# Patient Record
Sex: FEMALE | Race: WHITE | Employment: UNEMPLOYED | URBAN - METROPOLITAN AREA
[De-identification: names, ages, dates, MRNs, and addresses within clinical notes are randomized per-mention and may not be internally consistent; named-entity substitution may affect disease eponyms.]

---

## 2017-01-22 ENCOUNTER — APPOINTMENT (OUTPATIENT)
Dept: NUCLEAR MEDICINE | Facility: HOSPITAL | Age: 57
DRG: 698 | End: 2017-01-22
Attending: EMERGENCY MEDICINE
Payer: MEDICAID

## 2017-01-22 ENCOUNTER — HOSPITAL ENCOUNTER (INPATIENT)
Facility: HOSPITAL | Age: 57
LOS: 5 days | Discharge: HOME | DRG: 698 | End: 2017-01-27
Attending: EMERGENCY MEDICINE | Admitting: HOSPITALIST
Payer: MEDICAID

## 2017-01-22 ENCOUNTER — APPOINTMENT (OUTPATIENT)
Dept: GENERAL RADIOLOGY | Facility: HOSPITAL | Age: 57
DRG: 698 | End: 2017-01-22
Attending: EMERGENCY MEDICINE
Payer: MEDICAID

## 2017-01-22 DIAGNOSIS — R07.9 ACUTE CHEST PAIN: Primary | ICD-10-CM

## 2017-01-22 PROBLEM — I21.4 NSTEMI (NON-ST ELEVATED MYOCARDIAL INFARCTION) (HCC): Status: ACTIVE | Noted: 2017-01-22

## 2017-01-22 LAB
ANION GAP SERPL CALC-SCNC: 12 MMOL/L (ref 0–18)
APTT PPP: 28.9 SECONDS (ref 23.2–35.3)
APTT PPP: 59.6 SECONDS (ref 23.2–35.3)
BASOPHILS # BLD: 0.1 K/UL (ref 0–0.2)
BASOPHILS NFR BLD: 1 %
BUN SERPL-MCNC: 22 MG/DL (ref 8–20)
BUN/CREAT SERPL: 17.1 (ref 10–20)
CALCIUM SERPL-MCNC: 9 MG/DL (ref 8.5–10.5)
CHLORIDE SERPL-SCNC: 103 MMOL/L (ref 95–110)
CHOLEST SERPL-MCNC: 194 MG/DL (ref 110–200)
CO2 SERPL-SCNC: 21 MMOL/L (ref 22–32)
CREAT SERPL-MCNC: 1.29 MG/DL (ref 0.5–1.5)
D DIMER PPP FEU-MCNC: 1.63 MCG/ML
EOSINOPHIL # BLD: 0.1 K/UL (ref 0–0.7)
EOSINOPHIL NFR BLD: 2 %
ERYTHROCYTE [DISTWIDTH] IN BLOOD BY AUTOMATED COUNT: 14.3 % (ref 11–15)
GLUCOSE BLDC GLUCOMTR-MCNC: 123 MG/DL (ref 70–99)
GLUCOSE BLDC GLUCOMTR-MCNC: 143 MG/DL (ref 70–99)
GLUCOSE BLDC GLUCOMTR-MCNC: 201 MG/DL (ref 70–99)
GLUCOSE SERPL-MCNC: 191 MG/DL (ref 70–99)
HCT VFR BLD AUTO: 36.7 % (ref 35–48)
HDLC SERPL-MCNC: 36 MG/DL
HGB BLD-MCNC: 12.3 G/DL (ref 12–16)
INR BLD: 1.1 (ref 0.9–1.2)
LDLC SERPL CALC-MCNC: 140 MG/DL (ref 0–99)
LYMPHOCYTES # BLD: 1.1 K/UL (ref 1–4)
LYMPHOCYTES NFR BLD: 17 %
MCH RBC QN AUTO: 30.7 PG (ref 27–32)
MCHC RBC AUTO-ENTMCNC: 33.4 G/DL (ref 32–37)
MCV RBC AUTO: 92 FL (ref 80–100)
MONOCYTES # BLD: 0.5 K/UL (ref 0–1)
MONOCYTES NFR BLD: 7 %
NEUTROPHILS # BLD AUTO: 4.8 K/UL (ref 1.8–7.7)
NEUTROPHILS NFR BLD: 73 %
NONHDLC SERPL-MCNC: 158 MG/DL
OSMOLALITY UR CALC.SUM OF ELEC: 290 MOSM/KG (ref 275–295)
PLATELET # BLD AUTO: 232 K/UL (ref 140–400)
PMV BLD AUTO: 10.4 FL (ref 7.4–10.3)
POTASSIUM SERPL-SCNC: 3.3 MMOL/L (ref 3.3–5.1)
PROTHROMBIN TIME: 14 SECONDS (ref 11.8–14.5)
RBC # BLD AUTO: 3.99 M/UL (ref 3.7–5.4)
SODIUM SERPL-SCNC: 136 MMOL/L (ref 136–144)
T4 FREE SERPL-MCNC: 0.82 NG/DL (ref 0.58–1.64)
TRIGL SERPL-MCNC: 89 MG/DL (ref 1–149)
TROPONIN I SERPL-MCNC: 0.08 NG/ML (ref ?–0.03)
TSH SERPL-ACNC: 7.03 UIU/ML (ref 0.34–5.6)
WBC # BLD AUTO: 6.5 K/UL (ref 4–11)

## 2017-01-22 PROCEDURE — 82962 GLUCOSE BLOOD TEST: CPT

## 2017-01-22 PROCEDURE — 84484 ASSAY OF TROPONIN QUANT: CPT | Performed by: HOSPITALIST

## 2017-01-22 PROCEDURE — 99285 EMERGENCY DEPT VISIT HI MDM: CPT

## 2017-01-22 PROCEDURE — 85025 COMPLETE CBC W/AUTO DIFF WBC: CPT | Performed by: EMERGENCY MEDICINE

## 2017-01-22 PROCEDURE — 78582 LUNG VENTILAT&PERFUS IMAGING: CPT

## 2017-01-22 PROCEDURE — 85730 THROMBOPLASTIN TIME PARTIAL: CPT | Performed by: EMERGENCY MEDICINE

## 2017-01-22 PROCEDURE — 85730 THROMBOPLASTIN TIME PARTIAL: CPT | Performed by: HOSPITALIST

## 2017-01-22 PROCEDURE — 84443 ASSAY THYROID STIM HORMONE: CPT | Performed by: HOSPITALIST

## 2017-01-22 PROCEDURE — 93010 ELECTROCARDIOGRAM REPORT: CPT | Performed by: EMERGENCY MEDICINE

## 2017-01-22 PROCEDURE — 85610 PROTHROMBIN TIME: CPT | Performed by: EMERGENCY MEDICINE

## 2017-01-22 PROCEDURE — 96374 THER/PROPH/DIAG INJ IV PUSH: CPT

## 2017-01-22 PROCEDURE — 84439 ASSAY OF FREE THYROXINE: CPT | Performed by: HOSPITALIST

## 2017-01-22 PROCEDURE — 71010 XR CHEST AP PORTABLE  (CPT=71010): CPT

## 2017-01-22 PROCEDURE — 93005 ELECTROCARDIOGRAM TRACING: CPT

## 2017-01-22 PROCEDURE — 84484 ASSAY OF TROPONIN QUANT: CPT | Performed by: EMERGENCY MEDICINE

## 2017-01-22 PROCEDURE — 83036 HEMOGLOBIN GLYCOSYLATED A1C: CPT | Performed by: HOSPITALIST

## 2017-01-22 PROCEDURE — 80061 LIPID PANEL: CPT | Performed by: EMERGENCY MEDICINE

## 2017-01-22 PROCEDURE — 80048 BASIC METABOLIC PNL TOTAL CA: CPT | Performed by: EMERGENCY MEDICINE

## 2017-01-22 PROCEDURE — 85379 FIBRIN DEGRADATION QUANT: CPT | Performed by: EMERGENCY MEDICINE

## 2017-01-22 RX ORDER — POTASSIUM CHLORIDE 20 MEQ/1
40 TABLET, EXTENDED RELEASE ORAL EVERY 4 HOURS
Status: COMPLETED | OUTPATIENT
Start: 2017-01-22 | End: 2017-01-22

## 2017-01-22 RX ORDER — ASPIRIN 81 MG/1
81 TABLET, CHEWABLE ORAL DAILY
Status: DISCONTINUED | OUTPATIENT
Start: 2017-01-22 | End: 2017-01-22

## 2017-01-22 RX ORDER — ONDANSETRON 2 MG/ML
4 INJECTION INTRAMUSCULAR; INTRAVENOUS EVERY 6 HOURS PRN
Status: DISCONTINUED | OUTPATIENT
Start: 2017-01-22 | End: 2017-01-27

## 2017-01-22 RX ORDER — LISINOPRIL 40 MG/1
40 TABLET ORAL DAILY
Status: ON HOLD | COMMUNITY
End: 2017-01-23

## 2017-01-22 RX ORDER — HEPARIN SODIUM 1000 [USP'U]/ML
60 INJECTION, SOLUTION INTRAVENOUS; SUBCUTANEOUS ONCE
Status: COMPLETED | OUTPATIENT
Start: 2017-01-22 | End: 2017-01-22

## 2017-01-22 RX ORDER — DEXTROSE MONOHYDRATE 25 G/50ML
50 INJECTION, SOLUTION INTRAVENOUS AS NEEDED
Status: DISCONTINUED | OUTPATIENT
Start: 2017-01-22 | End: 2017-01-27

## 2017-01-22 RX ORDER — HYDROCHLOROTHIAZIDE 25 MG/1
25 TABLET ORAL DAILY
Status: ON HOLD | COMMUNITY
End: 2017-01-23

## 2017-01-22 RX ORDER — LEVOTHYROXINE SODIUM 0.05 MG/1
50 TABLET ORAL
Status: DISCONTINUED | OUTPATIENT
Start: 2017-01-22 | End: 2017-01-27

## 2017-01-22 RX ORDER — HEPARIN SODIUM 1000 [USP'U]/ML
INJECTION, SOLUTION INTRAVENOUS; SUBCUTANEOUS
Status: COMPLETED
Start: 2017-01-22 | End: 2017-01-22

## 2017-01-22 RX ORDER — HEPARIN SODIUM AND DEXTROSE 10000; 5 [USP'U]/100ML; G/100ML
12 INJECTION INTRAVENOUS ONCE
Status: COMPLETED | OUTPATIENT
Start: 2017-01-22 | End: 2017-01-23

## 2017-01-22 RX ORDER — NITROGLYCERIN 20 MG/100ML
10 INJECTION INTRAVENOUS CONTINUOUS
Status: DISCONTINUED | OUTPATIENT
Start: 2017-01-22 | End: 2017-01-22

## 2017-01-22 RX ORDER — MORPHINE SULFATE 4 MG/ML
2 INJECTION, SOLUTION INTRAMUSCULAR; INTRAVENOUS ONCE
Status: DISCONTINUED | OUTPATIENT
Start: 2017-01-22 | End: 2017-01-22

## 2017-01-22 RX ORDER — ASPIRIN 325 MG
325 TABLET ORAL DAILY
Status: DISCONTINUED | OUTPATIENT
Start: 2017-01-22 | End: 2017-01-27

## 2017-01-22 RX ORDER — ACETAMINOPHEN 325 MG/1
650 TABLET ORAL EVERY 6 HOURS PRN
Status: DISCONTINUED | OUTPATIENT
Start: 2017-01-22 | End: 2017-01-27

## 2017-01-22 RX ORDER — HEPARIN SODIUM AND DEXTROSE 10000; 5 [USP'U]/100ML; G/100ML
INJECTION INTRAVENOUS CONTINUOUS
Status: DISCONTINUED | OUTPATIENT
Start: 2017-01-22 | End: 2017-01-23

## 2017-01-22 RX ORDER — AMLODIPINE BESYLATE 2.5 MG/1
2.5 TABLET ORAL DAILY
Status: DISCONTINUED | OUTPATIENT
Start: 2017-01-22 | End: 2017-01-23

## 2017-01-22 RX ORDER — ATORVASTATIN CALCIUM 40 MG/1
40 TABLET, FILM COATED ORAL NIGHTLY
Status: DISCONTINUED | OUTPATIENT
Start: 2017-01-22 | End: 2017-01-27

## 2017-01-22 RX ORDER — LEVOTHYROXINE SODIUM 0.05 MG/1
50 TABLET ORAL
Status: ON HOLD | COMMUNITY
End: 2017-01-23

## 2017-01-22 RX ORDER — LISINOPRIL 20 MG/1
20 TABLET ORAL DAILY
Status: DISCONTINUED | OUTPATIENT
Start: 2017-01-22 | End: 2017-01-22

## 2017-01-22 RX ORDER — AMLODIPINE BESYLATE 5 MG/1
2.5 TABLET ORAL DAILY
Status: DISCONTINUED | OUTPATIENT
Start: 2017-01-23 | End: 2017-01-22

## 2017-01-22 NOTE — ED PROVIDER NOTES
Patient Seen in: [facility] AND [facility] Emergency Department    History   Patient presents with:  Dyspnea JIMMY SOB (respiratory)    Stated Complaint:     HPI    76-year-old female with history of diabetes, hypertension, hyperlipidemia presents for evaluation o Current:/90 mmHg  Pulse 63  Temp(Src) 98 °F (36.7 °C) (Temporal)  Resp 19  Wt 66.044 kg  SpO2 95%        Physical Exam   Constitutional: She is oriented to person, place, and time. She appears well-developed and well-nourished.  She appears dist components within normal limits   POCT GLUCOSE - Abnormal; Notable for the following:     POC Glucose  201 (*)     All other components within normal limits   CBC W/ DIFFERENTIAL - Abnormal; Notable for the following:     MPV 10.4 (*)     All other compone Impression:  Acute chest pain  (primary encounter diagnosis)    Disposition:  Admit    Follow-up:  No follow-up provider specified. Medications Prescribed:  There are no discharge medications for this patient.       Present on Admission           ICD-10-

## 2017-01-22 NOTE — H&P
[facility] Hospitalist H&P       CC: Patient presents with:  Dyspnea JIMMY SOB (respiratory)       PCP: PHYSICIAN NONSTAFF    History of Present Illness: Patient is a 61year old female with PMH sig for HLD, HTN, DM with neuropathy, hs of congential hand and foot obvious abnormality, atraumatic. Eyes:  Sclera anicteric, No conjunctival pallor, EOMs intact. Nose: Nares normal. Septum midline. Mucosa normal. No drainage.    Throat: Lips, mucosa, and tongue normal. Teeth and gums normal.   Neck: Supple, symmetrica given, nitro  - trop 0.08 -> 0.08   - now chest pain free  - started on heparin drip per cardiology  - continue asa, BB, add statin, and continue heparin drip  - trend trop, check VQ to RO PE given elevated DDIMER  - admit to tele, poss angiogram in am per

## 2017-01-23 ENCOUNTER — APPOINTMENT (OUTPATIENT)
Dept: NUCLEAR MEDICINE | Facility: HOSPITAL | Age: 57
DRG: 698 | End: 2017-01-23
Attending: INTERNAL MEDICINE
Payer: MEDICAID

## 2017-01-23 ENCOUNTER — APPOINTMENT (OUTPATIENT)
Dept: CV DIAGNOSTICS | Facility: HOSPITAL | Age: 57
DRG: 698 | End: 2017-01-23
Attending: INTERNAL MEDICINE
Payer: MEDICAID

## 2017-01-23 LAB
ANION GAP SERPL CALC-SCNC: 11 MMOL/L (ref 0–18)
APTT PPP: 29.1 SECONDS (ref 23.2–35.3)
APTT PPP: 60.1 SECONDS (ref 23.2–35.3)
BASOPHILS # BLD: 0.1 K/UL (ref 0–0.2)
BASOPHILS NFR BLD: 2 %
BUN SERPL-MCNC: 21 MG/DL (ref 8–20)
BUN/CREAT SERPL: 18.1 (ref 10–20)
CALCIUM SERPL-MCNC: 9.1 MG/DL (ref 8.5–10.5)
CHLORIDE SERPL-SCNC: 109 MMOL/L (ref 95–110)
CHOLEST SERPL-MCNC: 173 MG/DL (ref 110–200)
CO2 SERPL-SCNC: 20 MMOL/L (ref 22–32)
CREAT SERPL-MCNC: 1.16 MG/DL (ref 0.5–1.5)
EOSINOPHIL # BLD: 0.2 K/UL (ref 0–0.7)
EOSINOPHIL NFR BLD: 3 %
ERYTHROCYTE [DISTWIDTH] IN BLOOD BY AUTOMATED COUNT: 14.7 % (ref 11–15)
GLUCOSE BLDC GLUCOMTR-MCNC: 117 MG/DL (ref 70–99)
GLUCOSE BLDC GLUCOMTR-MCNC: 142 MG/DL (ref 70–99)
GLUCOSE BLDC GLUCOMTR-MCNC: 171 MG/DL (ref 70–99)
GLUCOSE SERPL-MCNC: 143 MG/DL (ref 70–99)
HBA1C MFR BLD: 6.9 % (ref 4–6)
HCT VFR BLD AUTO: 34.7 % (ref 35–48)
HDLC SERPL-MCNC: 33 MG/DL
HGB BLD-MCNC: 11.7 G/DL (ref 12–16)
INR BLD: 1.2 (ref 0.9–1.2)
INR BLD: 1.2 (ref 0.9–1.2)
LDLC SERPL CALC-MCNC: 112 MG/DL (ref 0–99)
LYMPHOCYTES # BLD: 1.6 K/UL (ref 1–4)
LYMPHOCYTES NFR BLD: 28 %
MAGNESIUM SERPL-MCNC: 1.9 MG/DL (ref 1.8–2.5)
MCH RBC QN AUTO: 31.1 PG (ref 27–32)
MCHC RBC AUTO-ENTMCNC: 33.6 G/DL (ref 32–37)
MCV RBC AUTO: 92.5 FL (ref 80–100)
MONOCYTES # BLD: 0.4 K/UL (ref 0–1)
MONOCYTES NFR BLD: 7 %
NEUTROPHILS # BLD AUTO: 3.3 K/UL (ref 1.8–7.7)
NEUTROPHILS NFR BLD: 58 %
NONHDLC SERPL-MCNC: 140 MG/DL
OSMOLALITY UR CALC.SUM OF ELEC: 295 MOSM/KG (ref 275–295)
PLATELET # BLD AUTO: 218 K/UL (ref 140–400)
PMV BLD AUTO: 10.6 FL (ref 7.4–10.3)
POTASSIUM SERPL-SCNC: 4.5 MMOL/L (ref 3.3–5.1)
POTASSIUM SERPL-SCNC: 4.5 MMOL/L (ref 3.3–5.1)
PROTHROMBIN TIME: 14.5 SECONDS (ref 11.8–14.5)
PROTHROMBIN TIME: 14.7 SECONDS (ref 11.8–14.5)
RBC # BLD AUTO: 3.75 M/UL (ref 3.7–5.4)
SODIUM SERPL-SCNC: 140 MMOL/L (ref 136–144)
TRIGL SERPL-MCNC: 142 MG/DL (ref 1–149)
WBC # BLD AUTO: 5.6 K/UL (ref 4–11)

## 2017-01-23 PROCEDURE — 82962 GLUCOSE BLOOD TEST: CPT

## 2017-01-23 PROCEDURE — 84132 ASSAY OF SERUM POTASSIUM: CPT | Performed by: HOSPITALIST

## 2017-01-23 PROCEDURE — 80061 LIPID PANEL: CPT | Performed by: HOSPITALIST

## 2017-01-23 PROCEDURE — 85610 PROTHROMBIN TIME: CPT | Performed by: HOSPITALIST

## 2017-01-23 PROCEDURE — 80048 BASIC METABOLIC PNL TOTAL CA: CPT | Performed by: HOSPITALIST

## 2017-01-23 PROCEDURE — 83735 ASSAY OF MAGNESIUM: CPT | Performed by: HOSPITALIST

## 2017-01-23 PROCEDURE — 78452 HT MUSCLE IMAGE SPECT MULT: CPT

## 2017-01-23 PROCEDURE — 85730 THROMBOPLASTIN TIME PARTIAL: CPT | Performed by: HOSPITALIST

## 2017-01-23 PROCEDURE — 93016 CV STRESS TEST SUPVJ ONLY: CPT | Performed by: INTERNAL MEDICINE

## 2017-01-23 PROCEDURE — 85025 COMPLETE CBC W/AUTO DIFF WBC: CPT | Performed by: HOSPITALIST

## 2017-01-23 PROCEDURE — 93017 CV STRESS TEST TRACING ONLY: CPT

## 2017-01-23 PROCEDURE — 85730 THROMBOPLASTIN TIME PARTIAL: CPT | Performed by: INTERNAL MEDICINE

## 2017-01-23 PROCEDURE — 85610 PROTHROMBIN TIME: CPT | Performed by: INTERNAL MEDICINE

## 2017-01-23 PROCEDURE — 78452 HT MUSCLE IMAGE SPECT MULT: CPT | Performed by: INTERNAL MEDICINE

## 2017-01-23 PROCEDURE — 93018 CV STRESS TEST I&R ONLY: CPT | Performed by: INTERNAL MEDICINE

## 2017-01-23 RX ORDER — SODIUM CHLORIDE 0.9 % (FLUSH) 0.9 %
SYRINGE (ML) INJECTION
Status: COMPLETED
Start: 2017-01-23 | End: 2017-01-23

## 2017-01-23 RX ORDER — AMLODIPINE BESYLATE 5 MG/1
5 TABLET ORAL DAILY
Status: DISCONTINUED | OUTPATIENT
Start: 2017-01-24 | End: 2017-01-27

## 2017-01-23 RX ORDER — ASPIRIN 81 MG/1
324 TABLET, CHEWABLE ORAL ONCE
Status: DISCONTINUED | OUTPATIENT
Start: 2017-01-24 | End: 2017-01-23

## 2017-01-23 RX ORDER — CHLORHEXIDINE GLUCONATE 4 G/100ML
30 SOLUTION TOPICAL
Status: COMPLETED | OUTPATIENT
Start: 2017-01-24 | End: 2017-01-24

## 2017-01-23 RX ORDER — ATORVASTATIN CALCIUM 40 MG/1
40 TABLET, FILM COATED ORAL NIGHTLY
Qty: 30 TABLET | Refills: 0 | Status: SHIPPED | OUTPATIENT
Start: 2017-01-23 | End: 2017-01-31

## 2017-01-23 RX ORDER — AMLODIPINE BESYLATE 5 MG/1
5 TABLET ORAL DAILY
Qty: 30 TABLET | Refills: 0 | Status: SHIPPED | OUTPATIENT
Start: 2017-01-24 | End: 2017-01-31

## 2017-01-23 RX ORDER — SODIUM CHLORIDE 9 MG/ML
INJECTION, SOLUTION INTRAVENOUS CONTINUOUS
Status: DISCONTINUED | OUTPATIENT
Start: 2017-01-24 | End: 2017-01-26

## 2017-01-23 RX ORDER — LEVOTHYROXINE SODIUM 0.05 MG/1
50 TABLET ORAL
Qty: 30 TABLET | Refills: 0 | Status: SHIPPED | OUTPATIENT
Start: 2017-01-23 | End: 2017-02-21

## 2017-01-23 RX ORDER — LISINOPRIL 40 MG/1
40 TABLET ORAL DAILY
Qty: 30 TABLET | Refills: 0 | Status: SHIPPED | OUTPATIENT
Start: 2017-01-23 | End: 2017-01-24

## 2017-01-23 RX ORDER — AMLODIPINE BESYLATE 2.5 MG/1
2.5 TABLET ORAL ONCE
Status: COMPLETED | OUTPATIENT
Start: 2017-01-23 | End: 2017-01-23

## 2017-01-23 NOTE — ED NOTES
Cardiac Event. Started Cardiac event at 11:47. Called the code. EKG done, patient attached to monitor, pulse ox and BP. Systolic Blood pressure was > 200. MD and Cardiologist aware.  Ordered 5 mg iv metoprolol at 11:50 and another 5 mg iv metoprolol at 11:5

## 2017-01-23 NOTE — PROGRESS NOTES
[facility] Hospitalist Progress Note     CC: Hospital Follow up    PCP: No primary care provider on file.        Assessment/Plan:     Principal Problem:    Acute chest pain  Active Problems:    NSTEMI (non-ST elevated myocardial infarction) [facility])    Patient is a 5 Weights  01/23/17 0518 : 149 lb 1.6 oz (67.631 kg)  01/22/17 1624 : 145 lb 9.6 oz (66.044 kg)  01/22/17 1136 : 145 lb 9.6 oz (66.044 kg)      Exam  Gen: No acute distress, alert and oriented x3, no focal neurologic deficits  Heent: NC AT, mucous memb clear

## 2017-01-23 NOTE — CONSULTS
[facility]    PATIENT'S NAME: MICHEL SOLO   ATTENDING PHYSICIAN: Tad D. Juliana Blizzard, MD   CONSULTING PHYSICIAN: DAREK Haas MD   PATIENT ACCOUNT#:   [id number]    [facility]:   [id number]       YOB: 1960  ADMI 195/120. HEENT:  Normal.  NECK:  Supple. There is no increased jugular venous pressure. LUNGS:  Clear. HEART:  S1, S2. There is a soft systolic murmur. No S3.  ABDOMEN:  Soft. It was nontender. Liver and spleen are not enlarged.   EXTREMITIES:  Digi

## 2017-01-23 NOTE — PLAN OF CARE
Patient/Family Long Term Goal Progressing      Patient/Family Short Term Goal Progressing      Patient not having any chest pain overnight. Heparin drip per protocol. Possible angiogram per [facility] cardiology today.

## 2017-01-23 NOTE — PROGRESS NOTES
Greg [address] [place] [address] Cardiology  Progress Note    Eva Kincaid Patient Status:  Inpatient    1960 MRN [id number]   Location [facility]5W Attending Tori Powell MD   Hosp Day # 1 PCP No primary care provider on file.      Impression:  IMPRESSION: Daily   metoprolol Tartrate (LOPRESSOR) tab 12.5 mg 12.5 mg Oral 2x Daily(Beta Blocker)   AmLODIPine Besylate (NORVASC) tab 2.5 mg 2.5 mg Oral Daily   Levothyroxine Sodium (SYNTHROID) tab 50 mcg 50 mcg Oral Before breakfast       Laboratory Data:    Lab Re

## 2017-01-23 NOTE — PAYOR COMM NOTE
Attending Physician: Denisse Arenas MD    Review Type: CONTINUED STAY  Reviewer: Alice Ruffin     Date: January 23, 2017 - 1:26 PM  Payor: [address] [place] [address]  Authorization Number: [id number]  Admit date: 1/22/2017 11:35 AM        REVIEWER CO ED Provider Notes by Horacio Sparks MD at 1/22/2017  2:55 PM  Version 2 of 2    Author:  oHracio Sparks MD Service:  (none) Author Type:  Physician    Filed:  1/22/2017  3:01 PM Note Time:  1/22/2017  2:55 PM Status:  Addendum    :  Stefan Bartholomew reviewed and negative except as noted above. PSFH elements reviewed from today and agreed except as otherwise stated in HPI.     Physical Exam     ED Triage Vitals   BP 01/22/17 1136 195/119 mmHg   Pulse 01/22/17 1136 101   Resp 01/22/17 1136 20   Temp 0 normal limits   TROPONIN I, 0 HOUR - Abnormal; Notable for the following:     Troponin 0.08 (*)     All other components within normal limits   LIPID PANEL - Abnormal; Notable for the following:     Non HDL Chol 158 (*)     LDL Cholesterol 140 (*)     All need for urgent cardiac catheterization. Noted to be hypertensive, given metoprolol with improvement in heart rate and blood pressure. Troponin Conor mildly elevated but stable, in the setting of renal dysfunction.   D-dimer elevated, VQ scan negative for P • Hyperlipidemia    • Thyroid disease    • Essential hypertension    • Diabetes ([facility] [address]    • Congenital hand deformity            Past Surgical History    AMPUTATION FOOT,TRANSMETATARSAL      CARPAL TUNNEL RELEASE         Medications :  Not on File       F alert and oriented to person, place, and time. No focal deficit   Skin: Skin is warm and dry. No rash noted. Psychiatric: She has a normal mood and affect. Nursing note and vitals reviewed.       Xr Chest Ap Portable  (cpt=71010)    1/22/2017  CONCLUS RAINBOW DRAW GOLD   RAINBOW DRAW LAVENDER   RAINBOW DRAW LIGHT GREEN   RAINBOW DRAW LAVENDER TALL (BNP)   RAINBOW DRAW DARK GREEN      EKG    Rate, intervals and axes as noted on EKG Report.    QTc 432   Rate: 76  Rhythm: Sinus Rhythm  Readin PMH sig for HLD, HTN, DM with neuropathy, hs of congential hand and foot deformities, poss CKD, who presents with Chest pain and sob.   Patient states she states on Friday she began to feel sob on and off, which progressed to yesterday, but was associated w mucosa, and tongue normal. Teeth and gums normal.   Neck: Supple, symmetrical, trachea midline, no cervical or supraclavicular lymph adenopathy, thyroid: no enlargment/tenderness/nodules appreciated   Lungs:   Clear to auscultation bilaterally.  Normal effo to RO PE given elevated DDIMER  - admit to tele, poss angiogram in am per cardiology  - check lipids and a1c    DM  - was on metformin, unknown a1c  - will hold metfromin  - check A1c  - ssi and accuchecks     HTN  - metoprolol  - hold hctz for now  - titr

## 2017-01-24 ENCOUNTER — APPOINTMENT (OUTPATIENT)
Dept: INTERVENTIONAL RADIOLOGY/VASCULAR | Facility: HOSPITAL | Age: 57
DRG: 698 | End: 2017-01-24
Attending: INTERNAL MEDICINE
Payer: MEDICAID

## 2017-01-24 LAB
BNP SERPL-MCNC: 1195 PG/ML (ref 0–100)
GLUCOSE BLDC GLUCOMTR-MCNC: 113 MG/DL (ref 70–99)
GLUCOSE BLDC GLUCOMTR-MCNC: 124 MG/DL (ref 70–99)
GLUCOSE BLDC GLUCOMTR-MCNC: 147 MG/DL (ref 70–99)
GLUCOSE BLDC GLUCOMTR-MCNC: 149 MG/DL (ref 70–99)

## 2017-01-24 PROCEDURE — 93460 R&L HRT ART/VENTRICLE ANGIO: CPT

## 2017-01-24 PROCEDURE — B2111ZZ FLUOROSCOPY OF MULTIPLE CORONARY ARTERIES USING LOW OSMOLAR CONTRAST: ICD-10-PCS | Performed by: HOSPITALIST

## 2017-01-24 PROCEDURE — 4A023N8 MEASUREMENT OF CARDIAC SAMPLING AND PRESSURE, BILATERAL, PERCUTANEOUS APPROACH: ICD-10-PCS | Performed by: HOSPITALIST

## 2017-01-24 PROCEDURE — 82962 GLUCOSE BLOOD TEST: CPT

## 2017-01-24 PROCEDURE — B2151ZZ FLUOROSCOPY OF LEFT HEART USING LOW OSMOLAR CONTRAST: ICD-10-PCS | Performed by: HOSPITALIST

## 2017-01-24 PROCEDURE — 83880 ASSAY OF NATRIURETIC PEPTIDE: CPT | Performed by: INTERNAL MEDICINE

## 2017-01-24 RX ORDER — LIDOCAINE HYDROCHLORIDE 20 MG/ML
INJECTION, SOLUTION EPIDURAL; INFILTRATION; INTRACAUDAL; PERINEURAL
Status: COMPLETED
Start: 2017-01-24 | End: 2017-01-24

## 2017-01-24 RX ORDER — ASPIRIN 325 MG
325 TABLET ORAL DAILY
Qty: 30 TABLET | Refills: 0 | Status: SHIPPED | OUTPATIENT
Start: 2017-01-24 | End: 2017-01-31

## 2017-01-24 RX ORDER — LISINOPRIL 5 MG/1
5 TABLET ORAL DAILY
Status: DISCONTINUED | OUTPATIENT
Start: 2017-01-24 | End: 2017-01-26

## 2017-01-24 RX ORDER — FUROSEMIDE 10 MG/ML
40 INJECTION INTRAMUSCULAR; INTRAVENOUS 3 TIMES DAILY
Status: DISCONTINUED | OUTPATIENT
Start: 2017-01-24 | End: 2017-01-24

## 2017-01-24 RX ORDER — HEPARIN SODIUM 5000 [USP'U]/ML
5000 INJECTION, SOLUTION INTRAVENOUS; SUBCUTANEOUS EVERY 12 HOURS SCHEDULED
Status: DISCONTINUED | OUTPATIENT
Start: 2017-01-25 | End: 2017-01-27

## 2017-01-24 RX ORDER — SPIRONOLACTONE 25 MG/1
25 TABLET ORAL DAILY
Status: DISCONTINUED | OUTPATIENT
Start: 2017-01-24 | End: 2017-01-26

## 2017-01-24 RX ORDER — FUROSEMIDE 10 MG/ML
40 INJECTION INTRAMUSCULAR; INTRAVENOUS
Status: DISCONTINUED | OUTPATIENT
Start: 2017-01-24 | End: 2017-01-26

## 2017-01-24 RX ORDER — MIDAZOLAM HYDROCHLORIDE 1 MG/ML
INJECTION INTRAMUSCULAR; INTRAVENOUS
Status: COMPLETED
Start: 2017-01-24 | End: 2017-01-24

## 2017-01-24 NOTE — PROGRESS NOTES
[facility] Hospitalist Progress Note     CC: Hospital Follow up    PCP: No primary care provider on file.        Assessment/Plan:     Principal Problem:    Acute chest pain  Active Problems:    NSTEMI (non-ST elevated myocardial infarction) [facility])    Patient is a 5 3392   Gross per 24 hour   Intake    600 ml   Output      0 ml   Net    600 ml       Last 3 Weights  01/24/17 0500 : 148 lb 9.6 oz (67.405 kg)  01/23/17 0518 : 149 lb 1.6 oz (67.631 kg)  01/22/17 1624 : 145 lb 9.6 oz (66.044 kg)  01/22/17 1136 : 145 lb 9.6 Tartrate  12.5 mg Oral 2x Daily(Beta Blocker)   • Levothyroxine Sodium  50 mcg Oral Before breakfast     • sodium chloride 20 mL/hr at 01/24/17 0637     dextrose, acetaminophen, ondansetron HCl

## 2017-01-24 NOTE — PAYOR COMM NOTE
Damien Treannielois #[id number]  (56 year old F)       [facility] [phone]         Fabby Garces MD Physician Signed  Progress Notes 1/24/2017  1:34 PM      Expand All Collapse All    [facility] Hospitalist Progress Note      CC: Hospital Follow up    PCP: No primary care pr °C)  Pulse:  [60-84] 63  Resp:  [12-18] 15  BP: (132-162)/() 149/86 mmHg      Intake/Output:    Intake/Output Summary (Last 24 hours) at 01/24/17 1334  Last data filed at 01/24/17 0637    Gross per 24 hour    Intake     600 ml    Output       0 ml  furosemide   40 mg  Intravenous  TID    •  spironolactone   25 mg  Oral  Daily    •  lisinopril   5 mg  Oral  Daily    •  AmLODIPine Besylate   5 mg  Oral  Daily    •  Atorvastatin Calcium   40 mg  Oral  Nightly    •  insulin aspart   1-7 Units  Subcutaneo

## 2017-01-24 NOTE — BRIEF PROCEDURE NOTE
[facility]    Brief Cardiac Cath Note  Trina Munguia Patient Status:  Inpatient    1960 MRN [id number]   Location [facility] Attending Fabby Garces MD   Hosp Day # 2 PCP No primary care provider on file.

## 2017-01-25 PROBLEM — I50.21 ACUTE SYSTOLIC HEART FAILURE (HCC): Status: ACTIVE | Noted: 2017-01-25

## 2017-01-25 LAB
ANION GAP SERPL CALC-SCNC: 9 MMOL/L (ref 0–18)
BASOPHILS # BLD: 0.1 K/UL (ref 0–0.2)
BASOPHILS NFR BLD: 1 %
BUN SERPL-MCNC: 25 MG/DL (ref 8–20)
BUN/CREAT SERPL: 19.5 (ref 10–20)
CALCIUM SERPL-MCNC: 9.4 MG/DL (ref 8.5–10.5)
CHLORIDE SERPL-SCNC: 107 MMOL/L (ref 95–110)
CO2 SERPL-SCNC: 24 MMOL/L (ref 22–32)
CREAT SERPL-MCNC: 1.28 MG/DL (ref 0.5–1.5)
EOSINOPHIL # BLD: 0.3 K/UL (ref 0–0.7)
EOSINOPHIL NFR BLD: 4 %
ERYTHROCYTE [DISTWIDTH] IN BLOOD BY AUTOMATED COUNT: 14.5 % (ref 11–15)
GLUCOSE BLDC GLUCOMTR-MCNC: 118 MG/DL (ref 70–99)
GLUCOSE BLDC GLUCOMTR-MCNC: 120 MG/DL (ref 70–99)
GLUCOSE BLDC GLUCOMTR-MCNC: 129 MG/DL (ref 70–99)
GLUCOSE BLDC GLUCOMTR-MCNC: 191 MG/DL (ref 70–99)
GLUCOSE SERPL-MCNC: 125 MG/DL (ref 70–99)
HCT VFR BLD AUTO: 37.5 % (ref 35–48)
HGB BLD-MCNC: 12.4 G/DL (ref 12–16)
LYMPHOCYTES # BLD: 1.2 K/UL (ref 1–4)
LYMPHOCYTES NFR BLD: 16 %
MAGNESIUM SERPL-MCNC: 1.8 MG/DL (ref 1.8–2.5)
MCH RBC QN AUTO: 30.7 PG (ref 27–32)
MCHC RBC AUTO-ENTMCNC: 33.1 G/DL (ref 32–37)
MCV RBC AUTO: 92.8 FL (ref 80–100)
MONOCYTES # BLD: 0.6 K/UL (ref 0–1)
MONOCYTES NFR BLD: 8 %
NEUTROPHILS # BLD AUTO: 5.3 K/UL (ref 1.8–7.7)
NEUTROPHILS NFR BLD: 71 %
OSMOLALITY UR CALC.SUM OF ELEC: 296 MOSM/KG (ref 275–295)
PLATELET # BLD AUTO: 206 K/UL (ref 140–400)
PMV BLD AUTO: 10.2 FL (ref 7.4–10.3)
POTASSIUM SERPL-SCNC: 4.3 MMOL/L (ref 3.3–5.1)
RBC # BLD AUTO: 4.05 M/UL (ref 3.7–5.4)
SODIUM SERPL-SCNC: 140 MMOL/L (ref 136–144)
WBC # BLD AUTO: 7.5 K/UL (ref 4–11)

## 2017-01-25 PROCEDURE — 82962 GLUCOSE BLOOD TEST: CPT

## 2017-01-25 PROCEDURE — 85025 COMPLETE CBC W/AUTO DIFF WBC: CPT | Performed by: HOSPITALIST

## 2017-01-25 PROCEDURE — 80048 BASIC METABOLIC PNL TOTAL CA: CPT | Performed by: HOSPITALIST

## 2017-01-25 PROCEDURE — 83735 ASSAY OF MAGNESIUM: CPT | Performed by: HOSPITALIST

## 2017-01-25 RX ORDER — MAGNESIUM OXIDE 400 MG (241.3 MG MAGNESIUM) TABLET
400 TABLET ONCE
Status: COMPLETED | OUTPATIENT
Start: 2017-01-25 | End: 2017-01-25

## 2017-01-25 NOTE — PROGRESS NOTES
[facility] Cardiology  Progress Note    Heidy Gutierrez Patient Status:  Inpatient    1960 MRN [id number]   Location [facility]5W Attending Leroy Giordano MD   Hosp Day # 3 PCP No primary care provider on file.      Impression:  IMPRESSION: Atorvastatin Calcium (LIPITOR) tab 40 mg 40 mg Oral Nightly   dextrose injection 50 mL 50 mL Intravenous PRN   acetaminophen (TYLENOL) tab 650 mg 650 mg Oral Q6H PRN   ondansetron HCl (ZOFRAN) injection 4 mg 4 mg Intravenous Q6H PRN   insulin aspart (NOV

## 2017-01-25 NOTE — PROGRESS NOTES
[facility] Hospitalist Progress Note     CC: Hospital Follow up    PCP: No primary care provider on file.        Assessment/Plan:     Principal Problem:    Acute chest pain  Active Problems:    NSTEMI (non-ST elevated myocardial infarction) (HCC)    Acute systolic (130-148)/(76-90) 143/87 mmHg      Intake/Output:    Intake/Output Summary (Last 24 hours) at 01/25/17 1525  Last data filed at 01/25/17 0649   Gross per 24 hour   Intake    360 ml   Output      0 ml   Net    360 ml       Last 3 Weights  01/25/17 0500 : 14 • metoprolol Tartrate  12.5 mg Oral 2x Daily(Beta Blocker)   • Levothyroxine Sodium  50 mcg Oral Before breakfast     • sodium chloride 20 mL/hr at 01/24/17 0637     dextrose, acetaminophen, ondansetron HCl

## 2017-01-25 NOTE — PROGRESS NOTES
Core measure update: EF 25%. On ACEi. On Spironolactone.  Currently on metoprolol tartrate, consider converting to long-acting B-blocker if clinically appropriate

## 2017-01-25 NOTE — CARDIAC REHAB
Patient to be have follow-up at the [facility]. Information on Phase 2 Cardiac Rehab given to patient.

## 2017-01-26 LAB
ANION GAP SERPL CALC-SCNC: 11 MMOL/L (ref 0–18)
BUN SERPL-MCNC: 33 MG/DL (ref 8–20)
BUN/CREAT SERPL: 21.2 (ref 10–20)
CALCIUM SERPL-MCNC: 9.6 MG/DL (ref 8.5–10.5)
CHLORIDE SERPL-SCNC: 101 MMOL/L (ref 95–110)
CO2 SERPL-SCNC: 28 MMOL/L (ref 22–32)
CREAT SERPL-MCNC: 1.56 MG/DL (ref 0.5–1.5)
GLUCOSE BLDC GLUCOMTR-MCNC: 123 MG/DL (ref 70–99)
GLUCOSE BLDC GLUCOMTR-MCNC: 142 MG/DL (ref 70–99)
GLUCOSE BLDC GLUCOMTR-MCNC: 144 MG/DL (ref 70–99)
GLUCOSE BLDC GLUCOMTR-MCNC: 201 MG/DL (ref 70–99)
GLUCOSE SERPL-MCNC: 131 MG/DL (ref 70–99)
MAGNESIUM SERPL-MCNC: 1.9 MG/DL (ref 1.8–2.5)
OSMOLALITY UR CALC.SUM OF ELEC: 299 MOSM/KG (ref 275–295)
POTASSIUM SERPL-SCNC: 4.3 MMOL/L (ref 3.3–5.1)
SODIUM SERPL-SCNC: 140 MMOL/L (ref 136–144)

## 2017-01-26 PROCEDURE — 80048 BASIC METABOLIC PNL TOTAL CA: CPT | Performed by: HOSPITALIST

## 2017-01-26 PROCEDURE — 83735 ASSAY OF MAGNESIUM: CPT | Performed by: HOSPITALIST

## 2017-01-26 PROCEDURE — 82962 GLUCOSE BLOOD TEST: CPT

## 2017-01-26 NOTE — CARDIAC REHAB
CHF teaching concerning daily weights, sodium restriction, fluid restriction and exercise. Discussed possibility of follow up with the [facility] and Cardiac Rehab Phase 2.

## 2017-01-27 VITALS
HEIGHT: 63 IN | SYSTOLIC BLOOD PRESSURE: 116 MMHG | BODY MASS INDEX: 25.12 KG/M2 | DIASTOLIC BLOOD PRESSURE: 73 MMHG | HEART RATE: 63 BPM | WEIGHT: 141.81 LBS | TEMPERATURE: 98 F | RESPIRATION RATE: 16 BRPM | OXYGEN SATURATION: 99 %

## 2017-01-27 LAB
ANION GAP SERPL CALC-SCNC: 9 MMOL/L (ref 0–18)
BUN SERPL-MCNC: 36 MG/DL (ref 8–20)
BUN/CREAT SERPL: 25.2 (ref 10–20)
CALCIUM SERPL-MCNC: 9.4 MG/DL (ref 8.5–10.5)
CHLORIDE SERPL-SCNC: 100 MMOL/L (ref 95–110)
CO2 SERPL-SCNC: 26 MMOL/L (ref 22–32)
CREAT SERPL-MCNC: 1.43 MG/DL (ref 0.5–1.5)
GLUCOSE BLDC GLUCOMTR-MCNC: 123 MG/DL (ref 70–99)
GLUCOSE BLDC GLUCOMTR-MCNC: 146 MG/DL (ref 70–99)
GLUCOSE SERPL-MCNC: 148 MG/DL (ref 70–99)
OSMOLALITY UR CALC.SUM OF ELEC: 291 MOSM/KG (ref 275–295)
POTASSIUM SERPL-SCNC: 5 MMOL/L (ref 3.3–5.1)
SODIUM SERPL-SCNC: 135 MMOL/L (ref 136–144)

## 2017-01-27 PROCEDURE — 80048 BASIC METABOLIC PNL TOTAL CA: CPT | Performed by: HOSPITALIST

## 2017-01-27 PROCEDURE — 82962 GLUCOSE BLOOD TEST: CPT

## 2017-01-27 RX ORDER — SPIRONOLACTONE 25 MG/1
25 TABLET ORAL DAILY
Status: DISCONTINUED | OUTPATIENT
Start: 2017-01-27 | End: 2017-01-27

## 2017-01-27 RX ORDER — SPIRONOLACTONE 25 MG/1
25 TABLET ORAL DAILY
Qty: 30 TABLET | Refills: 0 | Status: SHIPPED | OUTPATIENT
Start: 2017-01-27 | End: 2017-01-31

## 2017-01-27 RX ORDER — LISINOPRIL 5 MG/1
5 TABLET ORAL DAILY
Qty: 30 TABLET | Refills: 0 | Status: SHIPPED | OUTPATIENT
Start: 2017-01-27 | End: 2017-01-31

## 2017-01-27 RX ORDER — FUROSEMIDE 20 MG/1
20 TABLET ORAL DAILY
Qty: 30 TABLET | Refills: 0 | Status: SHIPPED | OUTPATIENT
Start: 2017-01-27 | End: 2017-01-31

## 2017-01-27 RX ORDER — FUROSEMIDE 20 MG/1
20 TABLET ORAL DAILY
Status: DISCONTINUED | OUTPATIENT
Start: 2017-01-27 | End: 2017-01-27

## 2017-01-27 RX ORDER — LISINOPRIL 5 MG/1
5 TABLET ORAL DAILY
Status: DISCONTINUED | OUTPATIENT
Start: 2017-01-27 | End: 2017-01-27

## 2017-01-27 NOTE — PROGRESS NOTES
[facility] [address] [facility] Cardiology  Progress Note    Maricarmen Alanis Patient Status:  Inpatient    1960 MRN [id number]   Location [facility]5W Attending Matthew Bhatia MD   Hosp Day # 5 PCP No primary care provider on file.      Impression:  IMPRESSION: Subcutaneous TID CC   aspirin tab 325 mg 325 mg Oral Daily   metoprolol Tartrate (LOPRESSOR) tab 12.5 mg 12.5 mg Oral 2x Daily(Beta Blocker)   Levothyroxine Sodium (SYNTHROID) tab 50 mcg 50 mcg Oral Before breakfast       Laboratory Data:       Lab Results

## 2017-01-27 NOTE — PROGRESS NOTES
Greg [address] [place] [address] Cardiology  Progress Note    Delia Stewart Patient Status:  Inpatient    1960 MRN [id number]   Location [facility]5W Attending Parmjit Milton MD   Hosp Day # 5 PCP No primary care provider on file.      Impression:  IMPRESSION: ondansetron HCl (ZOFRAN) injection 4 mg 4 mg Intravenous Q6H PRN   insulin aspart (NOVOLOG) 100 UNIT/ML flexpen 1-7 Units 1-7 Units Subcutaneous TID CC   aspirin tab 325 mg 325 mg Oral Daily   metoprolol Tartrate (LOPRESSOR) tab 12.5 mg 12.5 mg Oral 2x D

## 2017-01-27 NOTE — PROGRESS NOTES
[facility] Hospitalist Progress Note     CC: Hospital Follow up    PCP: No primary care provider on file.        Assessment/Plan:     Principal Problem:    Acute chest pain  Active Problems:    NSTEMI (non-ST elevated myocardial infarction) (HCC)    Acute systolic resp. rate 16, height 5' 3\" (1.6 m), weight 140 lb 1.6 oz (63.549 kg), SpO2 99 %.     Temp:  [97.8 °F (36.6 °C)-98.1 °F (36.7 °C)] 98 °F (36.7 °C)  Pulse:  [61-69] 69  Resp:  [16-18] 16  BP: (110-133)/(70-87) 133/82 mmHg      Intake/Output:    Intake/Outpu TID CC   • aspirin  325 mg Oral Daily   • metoprolol Tartrate  12.5 mg Oral 2x Daily(Beta Blocker)   • Levothyroxine Sodium  50 mcg Oral Before breakfast        dextrose, acetaminophen, ondansetron HCl

## 2017-01-27 NOTE — PLAN OF CARE
CARDIOVASCULAR - ADULT    • Maintains optimal cardiac output and hemodynamic stability Adequate for Discharge    • Absence of cardiac arrhythmias or at baseline Adequate for Discharge        Diabetes/Glucose Control    • Glucose maintained within prescribe

## 2017-01-27 NOTE — DISCHARGE SUMMARY
[facility] Summary    Luca Montgomery Patient Status:  Inpatient    1960 MRN [id number]   Location [facility]5W Attending Jeremiah Velazquez MD   Hosp Day # 5 PCP No primary care provider on file nondistended, bowel sounds present  Skin- no  diffuse rash  Neuro/pysch-  alert and oriented, no focal deficits,  appropriate affect/insight    Discharge Medications:      Discharge Medications      START taking these medications       Instructions Prescri changed:  how much to take        Take 1 tablet (1,000 mg total) by mouth 2 (two) times daily with meals.     Quantity:  60 tablet   Refills:  0         CONTINUE taking these medications       Instructions Prescription details    Levothyroxine Sodium 50 MCG

## 2017-01-28 ENCOUNTER — TELEPHONE (OUTPATIENT)
Dept: MEDSURG UNIT | Facility: HOSPITAL | Age: 57
End: 2017-01-28

## 2017-01-31 PROBLEM — I27.20 PULMONARY HTN (HCC): Status: ACTIVE | Noted: 2017-01-31

## 2017-02-01 ENCOUNTER — OFFICE VISIT (OUTPATIENT)
Dept: CARDIOLOGY CLINIC | Facility: HOSPITAL | Age: 57
End: 2017-02-01
Attending: INTERNAL MEDICINE
Payer: MEDICAID

## 2017-02-01 VITALS
DIASTOLIC BLOOD PRESSURE: 70 MMHG | BODY MASS INDEX: 25 KG/M2 | WEIGHT: 143 LBS | RESPIRATION RATE: 17 BRPM | HEART RATE: 67 BPM | SYSTOLIC BLOOD PRESSURE: 128 MMHG | OXYGEN SATURATION: 100 %

## 2017-02-01 DIAGNOSIS — I50.23 ACUTE ON CHRONIC SYSTOLIC (CONGESTIVE) HEART FAILURE (HCC): ICD-10-CM

## 2017-02-01 DIAGNOSIS — I50.9 HEART FAILURE, UNSPECIFIED (HCC): Primary | ICD-10-CM

## 2017-02-01 LAB
ANION GAP SERPL CALC-SCNC: 8 MMOL/L (ref 0–18)
BUN SERPL-MCNC: 36 MG/DL (ref 8–20)
BUN/CREAT SERPL: 23.8 (ref 10–20)
CALCIUM SERPL-MCNC: 9.9 MG/DL (ref 8.5–10.5)
CHLORIDE SERPL-SCNC: 106 MMOL/L (ref 95–110)
CO2 SERPL-SCNC: 25 MMOL/L (ref 22–32)
CREAT SERPL-MCNC: 1.51 MG/DL (ref 0.5–1.5)
GLUCOSE SERPL-MCNC: 132 MG/DL (ref 70–99)
OSMOLALITY UR CALC.SUM OF ELEC: 298 MOSM/KG (ref 275–295)
POTASSIUM SERPL-SCNC: 5 MMOL/L (ref 3.3–5.1)
SODIUM SERPL-SCNC: 139 MMOL/L (ref 136–144)

## 2017-02-01 PROCEDURE — 36415 COLL VENOUS BLD VENIPUNCTURE: CPT | Performed by: CLINICAL NURSE SPECIALIST

## 2017-02-01 PROCEDURE — 99214 OFFICE O/P EST MOD 30 MIN: CPT | Performed by: CLINICAL NURSE SPECIALIST

## 2017-02-01 PROCEDURE — 80048 BASIC METABOLIC PNL TOTAL CA: CPT | Performed by: CLINICAL NURSE SPECIALIST

## 2017-02-01 PROCEDURE — 99211 OFF/OP EST MAY X REQ PHY/QHP: CPT | Performed by: CLINICAL NURSE SPECIALIST

## 2017-02-01 NOTE — PROGRESS NOTES
[facility]        Conchis Sesay is a 61year old female who presents to clinic for post hospitalization management of acute on chronic systolic heart failure. Patient presented to the ED on 1/22/17 with chest pain and sob.  Patient states she likely secondary to IV contrast while hospitalized    /70 mmHg  Pulse 67  Resp 17  Wt 143 lb (64.864 kg)  SpO2 100%    Clinical weights: 1) 143    General appearance: alert, appears stated age and cooperative  Lungs: clear to auscultation bilaterally Plan:  Continue current medications    Continue tracking daily weights. Call with weight gain of 3 lbs overnight or concerning symptoms.  [phone]    32-64 oz fluid restriction    Less than 2000 mg sodium/salt diet    Cardiac Rehab [phone] to ebony

## 2017-02-01 NOTE — PATIENT INSTRUCTIONS
Continue current medications    Continue tracking daily weights. Call with weight gain of 3 lbs overnight or concerning symptoms.  [phone]    32-64 oz fluid restriction    Less than 2000 mg sodium/salt diet    Cardiac Rehab [phone] to schedule karin

## 2017-02-13 ENCOUNTER — OFFICE VISIT (OUTPATIENT)
Dept: CARDIOLOGY CLINIC | Facility: HOSPITAL | Age: 57
End: 2017-02-13
Attending: INTERNAL MEDICINE
Payer: MEDICAID

## 2017-02-13 VITALS
RESPIRATION RATE: 16 BRPM | BODY MASS INDEX: 26 KG/M2 | SYSTOLIC BLOOD PRESSURE: 93 MMHG | OXYGEN SATURATION: 98 % | DIASTOLIC BLOOD PRESSURE: 59 MMHG | WEIGHT: 145 LBS | HEART RATE: 54 BPM

## 2017-02-13 DIAGNOSIS — I50.9 HEART FAILURE, UNSPECIFIED (HCC): Primary | ICD-10-CM

## 2017-02-13 LAB
ANION GAP SERPL CALC-SCNC: 10 MMOL/L (ref 0–18)
BUN SERPL-MCNC: 37 MG/DL (ref 8–20)
BUN/CREAT SERPL: 25 (ref 10–20)
CALCIUM SERPL-MCNC: 9.6 MG/DL (ref 8.5–10.5)
CHLORIDE SERPL-SCNC: 104 MMOL/L (ref 95–110)
CO2 SERPL-SCNC: 24 MMOL/L (ref 22–32)
CREAT SERPL-MCNC: 1.48 MG/DL (ref 0.5–1.5)
GLUCOSE SERPL-MCNC: 125 MG/DL (ref 70–99)
OSMOLALITY UR CALC.SUM OF ELEC: 296 MOSM/KG (ref 275–295)
POTASSIUM SERPL-SCNC: 4.9 MMOL/L (ref 3.3–5.1)
SODIUM SERPL-SCNC: 138 MMOL/L (ref 136–144)

## 2017-02-13 PROCEDURE — 36415 COLL VENOUS BLD VENIPUNCTURE: CPT | Performed by: CLINICAL NURSE SPECIALIST

## 2017-02-13 PROCEDURE — 80048 BASIC METABOLIC PNL TOTAL CA: CPT | Performed by: CLINICAL NURSE SPECIALIST

## 2017-02-13 PROCEDURE — 99214 OFFICE O/P EST MOD 30 MIN: CPT | Performed by: CLINICAL NURSE SPECIALIST

## 2017-02-13 PROCEDURE — 99211 OFF/OP EST MAY X REQ PHY/QHP: CPT | Performed by: CLINICAL NURSE SPECIALIST

## 2017-02-13 RX ORDER — METOPROLOL SUCCINATE 25 MG/1
12.5 TABLET, EXTENDED RELEASE ORAL DAILY
Qty: 15 TABLET | Refills: 1 | Status: SHIPPED | OUTPATIENT
Start: 2017-02-13 | End: 2017-02-27

## 2017-02-13 NOTE — PROGRESS NOTES
[facility]        Domi Arevalo is a 61year old female who presents to clinic for post hospitalization management of acute on chronic systolic heart failure. Patient presented to the ED on 1/22/17 with chest pain and sob.  Patient states she 53  Resp 16  Wt 145 lb (65.772 kg)  SpO2 98%    Clinical weights: 1) 143 2) 145  Home weights:              2) 143    General appearance: alert, appears stated age and cooperative  Lungs: clear to auscultation bilaterally  Heart: S1, S2 normal, regular rat elevated, but trending down (1.4) from baseline (1.2). Volume status stable. At this time, will change patient to metoprolol succinate 12.5 mg and stop metoprolol tartrate. Will follow closely.       Plan:  Please start taking Metoprolol succinate 12.5 mg (

## 2017-02-13 NOTE — PATIENT INSTRUCTIONS
Please start taking Metoprolol succinate 12.5 mg (half tablet) daily. Stop taking Metoprolol tartrate 12.5 mg twice daily. Continue tracking daily weights. Call with weight gain of 3 lbs overnight or concerning symptoms.     32-64 oz fluid restriction

## 2017-02-21 PROBLEM — R07.9 ACUTE CHEST PAIN: Status: RESOLVED | Noted: 2017-01-22 | Resolved: 2017-02-21

## 2017-02-21 PROBLEM — E11.59 CONTROLLED TYPE 2 DIABETES MELLITUS WITH CIRCULATORY DISORDER, WITHOUT LONG-TERM CURRENT USE OF INSULIN (HCC): Status: ACTIVE | Noted: 2017-02-21

## 2017-02-21 PROBLEM — E11.69 DYSLIPIDEMIA ASSOCIATED WITH TYPE 2 DIABETES MELLITUS: Status: ACTIVE | Noted: 2017-02-21

## 2017-02-21 PROBLEM — E11.69 DYSLIPIDEMIA ASSOCIATED WITH TYPE 2 DIABETES MELLITUS (HCC): Status: ACTIVE | Noted: 2017-02-21

## 2017-02-21 PROBLEM — I10 HYPERTENSION GOAL BP (BLOOD PRESSURE) < 130/80: Status: ACTIVE | Noted: 2017-02-21

## 2017-02-21 PROBLEM — E03.9 ACQUIRED HYPOTHYROIDISM: Status: ACTIVE | Noted: 2017-02-21

## 2017-02-21 PROBLEM — I25.118 CORONARY ARTERY DISEASE OF NATIVE ARTERY OF NATIVE HEART WITH STABLE ANGINA PECTORIS (HCC): Status: ACTIVE | Noted: 2017-02-21

## 2017-02-21 PROBLEM — E78.5 DYSLIPIDEMIA ASSOCIATED WITH TYPE 2 DIABETES MELLITUS (HCC): Status: ACTIVE | Noted: 2017-02-21

## 2017-02-21 PROBLEM — E78.5 DYSLIPIDEMIA ASSOCIATED WITH TYPE 2 DIABETES MELLITUS: Status: ACTIVE | Noted: 2017-02-21

## 2017-02-27 PROBLEM — I50.22 CHRONIC SYSTOLIC CHF (CONGESTIVE HEART FAILURE) (HCC): Status: ACTIVE | Noted: 2017-02-27

## 2017-02-27 PROBLEM — I25.5 ISCHEMIC CARDIOMYOPATHY: Status: ACTIVE | Noted: 2017-02-27

## 2017-02-27 PROBLEM — E78.00 HYPERCHOLESTEROLEMIA: Status: ACTIVE | Noted: 2017-02-27

## 2017-02-27 PROCEDURE — 36415 COLL VENOUS BLD VENIPUNCTURE: CPT | Performed by: INTERNAL MEDICINE

## 2017-02-27 PROCEDURE — 86803 HEPATITIS C AB TEST: CPT | Performed by: INTERNAL MEDICINE

## 2017-03-15 ENCOUNTER — OFFICE VISIT (OUTPATIENT)
Dept: CARDIOLOGY CLINIC | Facility: HOSPITAL | Age: 57
End: 2017-03-15
Attending: INTERNAL MEDICINE
Payer: MEDICAID

## 2017-03-15 VITALS
OXYGEN SATURATION: 100 % | WEIGHT: 149.81 LBS | HEART RATE: 61 BPM | RESPIRATION RATE: 18 BRPM | SYSTOLIC BLOOD PRESSURE: 120 MMHG | DIASTOLIC BLOOD PRESSURE: 72 MMHG | BODY MASS INDEX: 27 KG/M2

## 2017-03-15 DIAGNOSIS — I50.9 HEART FAILURE, UNSPECIFIED (HCC): Primary | ICD-10-CM

## 2017-03-15 LAB
ANION GAP SERPL CALC-SCNC: 9 MMOL/L (ref 0–18)
BNP SERPL-MCNC: 82 PG/ML (ref 0–100)
BUN SERPL-MCNC: 24 MG/DL (ref 8–20)
BUN/CREAT SERPL: 18.3 (ref 10–20)
CALCIUM SERPL-MCNC: 9.4 MG/DL (ref 8.5–10.5)
CHLORIDE SERPL-SCNC: 105 MMOL/L (ref 95–110)
CO2 SERPL-SCNC: 26 MMOL/L (ref 22–32)
CREAT SERPL-MCNC: 1.31 MG/DL (ref 0.5–1.5)
GLUCOSE SERPL-MCNC: 131 MG/DL (ref 70–99)
OSMOLALITY UR CALC.SUM OF ELEC: 296 MOSM/KG (ref 275–295)
POTASSIUM SERPL-SCNC: 4.9 MMOL/L (ref 3.3–5.1)
SODIUM SERPL-SCNC: 140 MMOL/L (ref 136–144)

## 2017-03-15 PROCEDURE — 36415 COLL VENOUS BLD VENIPUNCTURE: CPT | Performed by: CLINICAL NURSE SPECIALIST

## 2017-03-15 PROCEDURE — 83880 ASSAY OF NATRIURETIC PEPTIDE: CPT | Performed by: CLINICAL NURSE SPECIALIST

## 2017-03-15 PROCEDURE — 99214 OFFICE O/P EST MOD 30 MIN: CPT | Performed by: CLINICAL NURSE SPECIALIST

## 2017-03-15 PROCEDURE — 99211 OFF/OP EST MAY X REQ PHY/QHP: CPT | Performed by: CLINICAL NURSE SPECIALIST

## 2017-03-15 PROCEDURE — 80048 BASIC METABOLIC PNL TOTAL CA: CPT | Performed by: CLINICAL NURSE SPECIALIST

## 2017-03-15 NOTE — PROGRESS NOTES
[address] [facility]        Naheed Levy is a 61year old female who presents to clinic for post hospitalization management of acute on chronic systolic heart failure. Patient presented to the ED on 1/22/17 with chest pain and sob.  Patient states she 17  Wt 149 lb 12.8 oz (67.949 kg)  SpO2 100%    Clinical weights: 1) 143 2) 145 3) 149  Home weights:              2) 143 3) 147    General appearance: alert, appears stated age and cooperative  Lungs: clear to auscultation bilaterally  Heart: S1, S2 janneth Plan:  Continue current medications    Continue tracking daily weights. Call with weight gain of 3 lbs overnight or concerning symptoms.     32-64 oz fluid restriction    Less than 2000 mg sodium/salt diet    Appointment with Dr Marcos Munguia as scheduled Friday 3

## 2017-03-15 NOTE — PATIENT INSTRUCTIONS
Continue current medications    Continue tracking daily weights. Call with weight gain of 3 lbs overnight or concerning symptoms.     32-64 oz fluid restriction    Less than 2000 mg sodium/salt diet    Appointment with Dr Kaia Ac as scheduled Friday 3/17    Re

## 2017-05-18 ENCOUNTER — LAB ENCOUNTER (OUTPATIENT)
Dept: LAB | Age: 57
End: 2017-05-18
Attending: INTERNAL MEDICINE
Payer: MEDICAID

## 2017-05-18 DIAGNOSIS — E03.9 ACQUIRED HYPOTHYROIDISM: Primary | ICD-10-CM

## 2017-05-18 DIAGNOSIS — E11.69 DYSLIPIDEMIA ASSOCIATED WITH TYPE 2 DIABETES MELLITUS (HCC): ICD-10-CM

## 2017-05-18 DIAGNOSIS — E11.59 CONTROLLED TYPE 2 DIABETES MELLITUS WITH OTHER CIRCULATORY COMPLICATION, WITHOUT LONG-TERM CURRENT USE OF INSULIN (HCC): ICD-10-CM

## 2017-05-18 DIAGNOSIS — E78.5 DYSLIPIDEMIA ASSOCIATED WITH TYPE 2 DIABETES MELLITUS (HCC): ICD-10-CM

## 2017-05-18 DIAGNOSIS — R80.9 PROTEINURIA, UNSPECIFIED TYPE: ICD-10-CM

## 2017-05-18 DIAGNOSIS — E11.59 DIABETIC VASCULOPATHY (HCC): Primary | ICD-10-CM

## 2017-05-18 PROCEDURE — 36415 COLL VENOUS BLD VENIPUNCTURE: CPT

## 2017-05-18 PROCEDURE — 82570 ASSAY OF URINE CREATININE: CPT

## 2017-05-18 PROCEDURE — 84443 ASSAY THYROID STIM HORMONE: CPT

## 2017-05-18 PROCEDURE — 80048 BASIC METABOLIC PNL TOTAL CA: CPT | Performed by: INTERNAL MEDICINE

## 2017-05-18 PROCEDURE — 84439 ASSAY OF FREE THYROXINE: CPT

## 2017-05-18 PROCEDURE — 82043 UR ALBUMIN QUANTITATIVE: CPT

## 2017-05-18 PROCEDURE — 81001 URINALYSIS AUTO W/SCOPE: CPT

## 2017-05-19 NOTE — PROGRESS NOTES
Quick Note:    Isac Gay; you continue to have persistent protein in your urine; I do think this is likely due to the heart issues but want to make sure and have you see dr Audelia Ventura or partner; below is contact info to make an appointment for the kidney specialist

## 2017-06-06 PROBLEM — E11.69 HYPERLIPIDEMIA ASSOCIATED WITH TYPE 2 DIABETES MELLITUS (HCC): Status: ACTIVE | Noted: 2017-06-06

## 2017-06-06 PROBLEM — E11.69 HYPERLIPIDEMIA ASSOCIATED WITH TYPE 2 DIABETES MELLITUS: Status: ACTIVE | Noted: 2017-06-06

## 2017-06-06 PROBLEM — E78.5 HYPERLIPIDEMIA ASSOCIATED WITH TYPE 2 DIABETES MELLITUS (HCC): Status: ACTIVE | Noted: 2017-06-06

## 2017-06-06 PROBLEM — E78.5 HYPERLIPIDEMIA ASSOCIATED WITH TYPE 2 DIABETES MELLITUS: Status: ACTIVE | Noted: 2017-06-06

## 2017-07-15 ENCOUNTER — HOSPITAL ENCOUNTER (EMERGENCY)
Facility: HOSPITAL | Age: 57
Discharge: HOME | End: 2017-07-15
Attending: EMERGENCY MEDICINE
Payer: MEDICAID

## 2017-07-15 ENCOUNTER — APPOINTMENT (OUTPATIENT)
Dept: GENERAL RADIOLOGY | Facility: HOSPITAL | Age: 57
End: 2017-07-15
Attending: EMERGENCY MEDICINE
Payer: MEDICAID

## 2017-07-15 VITALS
RESPIRATION RATE: 16 BRPM | HEART RATE: 69 BPM | OXYGEN SATURATION: 100 % | SYSTOLIC BLOOD PRESSURE: 156 MMHG | DIASTOLIC BLOOD PRESSURE: 62 MMHG | TEMPERATURE: 99 F

## 2017-07-15 DIAGNOSIS — L03.116 CELLULITIS OF LEFT FOOT: Primary | ICD-10-CM

## 2017-07-15 PROCEDURE — 73630 X-RAY EXAM OF FOOT: CPT | Performed by: EMERGENCY MEDICINE

## 2017-07-15 PROCEDURE — 99283 EMERGENCY DEPT VISIT LOW MDM: CPT

## 2017-07-15 RX ORDER — CEPHALEXIN 500 MG/1
500 CAPSULE ORAL 3 TIMES DAILY
Qty: 21 CAPSULE | Refills: 0 | Status: ON HOLD | OUTPATIENT
Start: 2017-07-15 | End: 2017-07-25

## 2017-07-15 NOTE — ED PROVIDER NOTES
Patient Seen in: [facility] AND [facility] Emergency Department    History   Patient presents with: Foot Pain    Stated Complaint: foot pain    HPI    Patient is a 48-year-old female that complains of pain to the lateral aspect of her left foot.   Pain started i mouth nightly.        Family History   Problem Relation Age of Onset   • Heart Disease     • Other[other] [OTHER]     • Diabetes     • High Blood Pressure     • Heart Disorder Father 61     MI tob   • Hypertension Father    • Lipids Father    • Heart Disord pain.  ============================================================  ED Course  ------------------------------------------------------------  [facility]           Disposition and Plan     Clinical Impression:  Cellulitis of left foot  (primary encounter diagnosis

## 2017-07-16 ENCOUNTER — APPOINTMENT (OUTPATIENT)
Dept: GENERAL RADIOLOGY | Facility: HOSPITAL | Age: 57
DRG: 234 | End: 2017-07-16
Payer: MEDICAID

## 2017-07-16 ENCOUNTER — HOSPITAL ENCOUNTER (INPATIENT)
Facility: HOSPITAL | Age: 57
LOS: 10 days | Discharge: HOME HEALTH | DRG: 234 | End: 2017-07-26
Admitting: HOSPITALIST
Payer: MEDICAID

## 2017-07-16 DIAGNOSIS — R07.9 ACUTE CHEST PAIN: Primary | ICD-10-CM

## 2017-07-16 DIAGNOSIS — I25.5 ISCHEMIC CARDIOMYOPATHY: ICD-10-CM

## 2017-07-16 PROBLEM — N17.9 ACUTE KIDNEY INJURY (HCC): Status: ACTIVE | Noted: 2017-07-16

## 2017-07-16 PROBLEM — R79.89 AZOTEMIA: Status: ACTIVE | Noted: 2017-07-16

## 2017-07-16 PROBLEM — D64.9 ANEMIA: Status: ACTIVE | Noted: 2017-07-16

## 2017-07-16 LAB
ANION GAP SERPL CALC-SCNC: 14 MMOL/L (ref 0–18)
ANION GAP SERPL CALC-SCNC: 15 MMOL/L (ref 0–18)
APTT PPP: 28.6 SECONDS (ref 23.2–35.3)
BASOPHILS # BLD: 0.1 K/UL (ref 0–0.2)
BASOPHILS NFR BLD: 1 %
BUN SERPL-MCNC: 38 MG/DL (ref 8–20)
BUN SERPL-MCNC: 38 MG/DL (ref 8–20)
BUN/CREAT SERPL: 22.1 (ref 10–20)
BUN/CREAT SERPL: 22.5 (ref 10–20)
CALCIUM SERPL-MCNC: 9.8 MG/DL (ref 8.5–10.5)
CALCIUM SERPL-MCNC: 9.9 MG/DL (ref 8.5–10.5)
CHLORIDE SERPL-SCNC: 98 MMOL/L (ref 95–110)
CHLORIDE SERPL-SCNC: 99 MMOL/L (ref 95–110)
CHOLEST SERPL-MCNC: 172 MG/DL (ref 110–200)
CO2 SERPL-SCNC: 25 MMOL/L (ref 22–32)
CO2 SERPL-SCNC: 26 MMOL/L (ref 22–32)
CREAT SERPL-MCNC: 1.69 MG/DL (ref 0.5–1.5)
CREAT SERPL-MCNC: 1.72 MG/DL (ref 0.5–1.5)
EOSINOPHIL # BLD: 0.1 K/UL (ref 0–0.7)
EOSINOPHIL NFR BLD: 2 %
ERYTHROCYTE [DISTWIDTH] IN BLOOD BY AUTOMATED COUNT: 13.6 % (ref 11–15)
ERYTHROCYTE [DISTWIDTH] IN BLOOD BY AUTOMATED COUNT: 14 % (ref 11–15)
GLUCOSE BLDC GLUCOMTR-MCNC: 105 MG/DL (ref 70–99)
GLUCOSE BLDC GLUCOMTR-MCNC: 167 MG/DL (ref 70–99)
GLUCOSE SERPL-MCNC: 104 MG/DL (ref 70–99)
GLUCOSE SERPL-MCNC: 136 MG/DL (ref 70–99)
HCT VFR BLD AUTO: 33.9 % (ref 35–48)
HCT VFR BLD AUTO: 34.1 % (ref 35–48)
HDLC SERPL-MCNC: 38 MG/DL
HGB BLD-MCNC: 11.4 G/DL (ref 12–16)
HGB BLD-MCNC: 11.5 G/DL (ref 12–16)
LDLC SERPL CALC-MCNC: 94 MG/DL (ref 0–99)
LYMPHOCYTES # BLD: 1 K/UL (ref 1–4)
LYMPHOCYTES NFR BLD: 12 %
MCH RBC QN AUTO: 31.8 PG (ref 27–32)
MCH RBC QN AUTO: 32.2 PG (ref 27–32)
MCHC RBC AUTO-ENTMCNC: 33.6 G/DL (ref 32–37)
MCHC RBC AUTO-ENTMCNC: 33.8 G/DL (ref 32–37)
MCV RBC AUTO: 94.8 FL (ref 80–100)
MCV RBC AUTO: 95.5 FL (ref 80–100)
MONOCYTES # BLD: 0.6 K/UL (ref 0–1)
MONOCYTES NFR BLD: 7 %
NEUTROPHILS # BLD AUTO: 6.8 K/UL (ref 1.8–7.7)
NEUTROPHILS NFR BLD: 79 %
NONHDLC SERPL-MCNC: 134 MG/DL
OSMOLALITY UR CALC.SUM OF ELEC: 297 MOSM/KG (ref 275–295)
OSMOLALITY UR CALC.SUM OF ELEC: 297 MOSM/KG (ref 275–295)
PLATELET # BLD AUTO: 205 K/UL (ref 140–400)
PLATELET # BLD AUTO: 208 K/UL (ref 140–400)
PMV BLD AUTO: 8.6 FL (ref 7.4–10.3)
PMV BLD AUTO: 9 FL (ref 7.4–10.3)
POTASSIUM SERPL-SCNC: 4.6 MMOL/L (ref 3.3–5.1)
POTASSIUM SERPL-SCNC: 4.7 MMOL/L (ref 3.3–5.1)
RBC # BLD AUTO: 3.57 M/UL (ref 3.7–5.4)
RBC # BLD AUTO: 3.57 M/UL (ref 3.7–5.4)
SODIUM SERPL-SCNC: 138 MMOL/L (ref 136–144)
SODIUM SERPL-SCNC: 139 MMOL/L (ref 136–144)
TRIGL SERPL-MCNC: 201 MG/DL (ref 1–149)
TROPONIN I SERPL-MCNC: 0.02 NG/ML (ref ?–0.03)
TROPONIN I SERPL-MCNC: 0.12 NG/ML (ref ?–0.03)
TROPONIN I SERPL-MCNC: 0.23 NG/ML (ref ?–0.03)
WBC # BLD AUTO: 7.4 K/UL (ref 4–11)
WBC # BLD AUTO: 8.6 K/UL (ref 4–11)

## 2017-07-16 PROCEDURE — 93010 ELECTROCARDIOGRAM REPORT: CPT | Performed by: INTERNAL MEDICINE

## 2017-07-16 PROCEDURE — 80048 BASIC METABOLIC PNL TOTAL CA: CPT | Performed by: HOSPITALIST

## 2017-07-16 PROCEDURE — 85025 COMPLETE CBC W/AUTO DIFF WBC: CPT

## 2017-07-16 PROCEDURE — 99285 EMERGENCY DEPT VISIT HI MDM: CPT

## 2017-07-16 PROCEDURE — 85027 COMPLETE CBC AUTOMATED: CPT | Performed by: HOSPITALIST

## 2017-07-16 PROCEDURE — 82962 GLUCOSE BLOOD TEST: CPT

## 2017-07-16 PROCEDURE — 93005 ELECTROCARDIOGRAM TRACING: CPT

## 2017-07-16 PROCEDURE — 93010 ELECTROCARDIOGRAM REPORT: CPT | Performed by: EMERGENCY MEDICINE

## 2017-07-16 PROCEDURE — 83036 HEMOGLOBIN GLYCOSYLATED A1C: CPT | Performed by: HOSPITALIST

## 2017-07-16 PROCEDURE — 71020 XR CHEST PA + LAT CHEST (CPT=71020): CPT

## 2017-07-16 PROCEDURE — 36415 COLL VENOUS BLD VENIPUNCTURE: CPT

## 2017-07-16 PROCEDURE — 84484 ASSAY OF TROPONIN QUANT: CPT | Performed by: EMERGENCY MEDICINE

## 2017-07-16 PROCEDURE — 84484 ASSAY OF TROPONIN QUANT: CPT | Performed by: HOSPITALIST

## 2017-07-16 PROCEDURE — 84484 ASSAY OF TROPONIN QUANT: CPT

## 2017-07-16 PROCEDURE — 80061 LIPID PANEL: CPT | Performed by: EMERGENCY MEDICINE

## 2017-07-16 PROCEDURE — 85730 THROMBOPLASTIN TIME PARTIAL: CPT | Performed by: HOSPITALIST

## 2017-07-16 PROCEDURE — 80048 BASIC METABOLIC PNL TOTAL CA: CPT

## 2017-07-16 RX ORDER — HEPARIN SODIUM AND DEXTROSE 10000; 5 [USP'U]/100ML; G/100ML
12 INJECTION INTRAVENOUS ONCE
Status: COMPLETED | OUTPATIENT
Start: 2017-07-16 | End: 2017-07-16

## 2017-07-16 RX ORDER — FUROSEMIDE 20 MG/1
20 TABLET ORAL NIGHTLY
Status: DISCONTINUED | OUTPATIENT
Start: 2017-07-16 | End: 2017-07-24

## 2017-07-16 RX ORDER — HEPARIN SODIUM AND DEXTROSE 10000; 5 [USP'U]/100ML; G/100ML
INJECTION INTRAVENOUS CONTINUOUS
Status: DISCONTINUED | OUTPATIENT
Start: 2017-07-17 | End: 2017-07-20

## 2017-07-16 RX ORDER — SODIUM CHLORIDE 0.9 % (FLUSH) 0.9 %
3 SYRINGE (ML) INJECTION AS NEEDED
Status: DISCONTINUED | OUTPATIENT
Start: 2017-07-16 | End: 2017-07-22 | Stop reason: HOSPADM

## 2017-07-16 RX ORDER — DEXTROSE MONOHYDRATE 25 G/50ML
50 INJECTION, SOLUTION INTRAVENOUS AS NEEDED
Status: DISCONTINUED | OUTPATIENT
Start: 2017-07-16 | End: 2017-07-26

## 2017-07-16 RX ORDER — ACETAMINOPHEN 325 MG/1
650 TABLET ORAL EVERY 6 HOURS PRN
Status: DISCONTINUED | OUTPATIENT
Start: 2017-07-16 | End: 2017-07-20

## 2017-07-16 RX ORDER — HEPARIN SODIUM 5000 [USP'U]/ML
5000 INJECTION, SOLUTION INTRAVENOUS; SUBCUTANEOUS EVERY 12 HOURS SCHEDULED
Status: DISCONTINUED | OUTPATIENT
Start: 2017-07-16 | End: 2017-07-16

## 2017-07-16 RX ORDER — METOPROLOL SUCCINATE 25 MG/1
25 TABLET, EXTENDED RELEASE ORAL NIGHTLY
Status: DISCONTINUED | OUTPATIENT
Start: 2017-07-16 | End: 2017-07-22 | Stop reason: ALTCHOICE

## 2017-07-16 RX ORDER — ONDANSETRON 2 MG/ML
4 INJECTION INTRAMUSCULAR; INTRAVENOUS EVERY 6 HOURS PRN
Status: DISCONTINUED | OUTPATIENT
Start: 2017-07-16 | End: 2017-07-20

## 2017-07-16 RX ORDER — NITROGLYCERIN 0.4 MG/1
0.4 TABLET SUBLINGUAL ONCE
Status: COMPLETED | OUTPATIENT
Start: 2017-07-16 | End: 2017-07-16

## 2017-07-16 RX ORDER — ATORVASTATIN CALCIUM 40 MG/1
40 TABLET, FILM COATED ORAL NIGHTLY
Status: DISCONTINUED | OUTPATIENT
Start: 2017-07-16 | End: 2017-07-23

## 2017-07-16 RX ORDER — SPIRONOLACTONE 25 MG/1
25 TABLET ORAL NIGHTLY
Status: DISCONTINUED | OUTPATIENT
Start: 2017-07-16 | End: 2017-07-20

## 2017-07-16 RX ORDER — HEPARIN SODIUM 1000 [USP'U]/ML
60 INJECTION, SOLUTION INTRAVENOUS; SUBCUTANEOUS ONCE
Status: COMPLETED | OUTPATIENT
Start: 2017-07-16 | End: 2017-07-16

## 2017-07-16 RX ORDER — LEVOTHYROXINE SODIUM 0.05 MG/1
50 TABLET ORAL
Status: DISCONTINUED | OUTPATIENT
Start: 2017-07-17 | End: 2017-07-22

## 2017-07-16 RX ORDER — ASPIRIN 325 MG
325 TABLET ORAL DAILY
Status: DISCONTINUED | OUTPATIENT
Start: 2017-07-16 | End: 2017-07-16

## 2017-07-16 RX ORDER — ASPIRIN 81 MG/1
81 TABLET ORAL DAILY
Status: DISCONTINUED | OUTPATIENT
Start: 2017-07-16 | End: 2017-07-18

## 2017-07-16 RX ORDER — LISINOPRIL 40 MG/1
40 TABLET ORAL NIGHTLY
Status: DISCONTINUED | OUTPATIENT
Start: 2017-07-16 | End: 2017-07-18

## 2017-07-16 NOTE — ED PROVIDER NOTES
Patient Seen in: [facility] AND [facility] Emergency Department    History   Patient presents with:  Chest Pain Angina (cardiovascular)    Stated Complaint: chest pain, shortness of breath, vomiting, sweating.     HPI    Patient is a 58-year-old female who presen Take 1 tablet (325 mg total) by mouth daily. furosemide 20 MG Oral Tab,  Take 1 tablet (20 mg total) by mouth daily. Atorvastatin Calcium 40 MG Oral Tab,  Take 1 tablet (40 mg total) by mouth nightly.        Family History   Problem Relation Age of Onse and bowel sounds are normal. There is no tenderness. There is no rigidity, no rebound and no guarding. Musculoskeletal: Normal range of motion. 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    Kalyani (primary encounter diagnosis)  Ischemic cardiomyopathy    Disposition:  Admit    Follow-up:  No follow-up provider specified.     Medications Prescribed:  Current Discharge Medication List        Present on Admission  Date Reviewed: 5/19/2017          ICD-1

## 2017-07-16 NOTE — ED INITIAL ASSESSMENT (HPI)
C/o chest pain, shortness of breath, vomiting, sweating.  Started 1/2 hr ago pt seen here yesterday for cellulitis

## 2017-07-16 NOTE — H&P
[facility] Hospitalist H&P       CC: Patient presents with:  Chest Pain Angina (cardiovascular)       PCP: Emery Tolbert MD      ASSESSMENT / PLAN:     Ms. Tiffanie Benítez is a 61year old female with PMH sig for CAD s/p NSTEMI 1/2017, CHF (EF 35%) HLD, HTN, DM with n presents with Chest pain and sob. Symptoms started around noon while she was driving her daughter to work.  Symptoms felt different than chest pain with prior NSTEMI, initially felt ill, then had substernal chest pain, mild SOB, diaphoresis and nausea/vomit Father    • Lipids Father    • Heart Disorder Mother 80     cabg    • hypothyroid[other] [OTHER] Daughter        Review of Systems  Comprehensive ROS reviewed and negative except for what's stated above.      OBJECTIVE:  /59   Pulse 56   Temp 98.2 °F

## 2017-07-17 ENCOUNTER — APPOINTMENT (OUTPATIENT)
Dept: GENERAL RADIOLOGY | Facility: HOSPITAL | Age: 57
DRG: 234 | End: 2017-07-17
Attending: HOSPITALIST
Payer: MEDICAID

## 2017-07-17 ENCOUNTER — APPOINTMENT (OUTPATIENT)
Dept: INTERVENTIONAL RADIOLOGY/VASCULAR | Facility: HOSPITAL | Age: 57
DRG: 234 | End: 2017-07-17
Attending: INTERNAL MEDICINE
Payer: MEDICAID

## 2017-07-17 LAB
ANION GAP SERPL CALC-SCNC: 12 MMOL/L (ref 0–18)
APTT PPP: 28.6 SECONDS (ref 23.2–35.3)
APTT PPP: 30.5 SECONDS (ref 23.2–35.3)
APTT PPP: 42.1 SECONDS (ref 23.2–35.3)
APTT PPP: 51.3 SECONDS (ref 23.2–35.3)
BASOPHILS # BLD: 0.1 K/UL (ref 0–0.2)
BASOPHILS NFR BLD: 1 %
BUN SERPL-MCNC: 40 MG/DL (ref 8–20)
BUN/CREAT SERPL: 23 (ref 10–20)
CALCIUM SERPL-MCNC: 9.3 MG/DL (ref 8.5–10.5)
CHLORIDE SERPL-SCNC: 100 MMOL/L (ref 95–110)
CO2 SERPL-SCNC: 24 MMOL/L (ref 22–32)
CREAT SERPL-MCNC: 1.74 MG/DL (ref 0.5–1.5)
EOSINOPHIL # BLD: 0.3 K/UL (ref 0–0.7)
EOSINOPHIL NFR BLD: 3 %
ERYTHROCYTE [DISTWIDTH] IN BLOOD BY AUTOMATED COUNT: 13.9 % (ref 11–15)
GLUCOSE BLDC GLUCOMTR-MCNC: 114 MG/DL (ref 70–99)
GLUCOSE BLDC GLUCOMTR-MCNC: 147 MG/DL (ref 70–99)
GLUCOSE BLDC GLUCOMTR-MCNC: 149 MG/DL (ref 70–99)
GLUCOSE BLDC GLUCOMTR-MCNC: 191 MG/DL (ref 70–99)
GLUCOSE SERPL-MCNC: 129 MG/DL (ref 70–99)
HBA1C MFR BLD: 7.4 % (ref 4–6)
HCT VFR BLD AUTO: 33.5 % (ref 35–48)
HGB BLD-MCNC: 11.3 G/DL (ref 12–16)
LYMPHOCYTES # BLD: 2.4 K/UL (ref 1–4)
LYMPHOCYTES NFR BLD: 28 %
MAGNESIUM SERPL-MCNC: 1.7 MG/DL (ref 1.8–2.5)
MCH RBC QN AUTO: 32.1 PG (ref 27–32)
MCHC RBC AUTO-ENTMCNC: 33.8 G/DL (ref 32–37)
MCV RBC AUTO: 94.9 FL (ref 80–100)
MONOCYTES # BLD: 0.8 K/UL (ref 0–1)
MONOCYTES NFR BLD: 9 %
NEUTROPHILS # BLD AUTO: 5.1 K/UL (ref 1.8–7.7)
NEUTROPHILS NFR BLD: 59 %
OSMOLALITY UR CALC.SUM OF ELEC: 293 MOSM/KG (ref 275–295)
PLATELET # BLD AUTO: 207 K/UL (ref 140–400)
PMV BLD AUTO: 9.1 FL (ref 7.4–10.3)
POTASSIUM SERPL-SCNC: 4.1 MMOL/L (ref 3.3–5.1)
RBC # BLD AUTO: 3.53 M/UL (ref 3.7–5.4)
SODIUM SERPL-SCNC: 136 MMOL/L (ref 136–144)
TROPONIN I SERPL-MCNC: 1.65 NG/ML (ref ?–0.03)
TROPONIN I SERPL-MCNC: 1.98 NG/ML (ref ?–0.03)
WBC # BLD AUTO: 8.6 K/UL (ref 4–11)

## 2017-07-17 PROCEDURE — 85730 THROMBOPLASTIN TIME PARTIAL: CPT | Performed by: HOSPITALIST

## 2017-07-17 PROCEDURE — 93454 CORONARY ARTERY ANGIO S&I: CPT

## 2017-07-17 PROCEDURE — 93005 ELECTROCARDIOGRAM TRACING: CPT

## 2017-07-17 PROCEDURE — 73630 X-RAY EXAM OF FOOT: CPT | Performed by: HOSPITALIST

## 2017-07-17 PROCEDURE — 85730 THROMBOPLASTIN TIME PARTIAL: CPT | Performed by: INTERNAL MEDICINE

## 2017-07-17 PROCEDURE — 4A023N7 MEASUREMENT OF CARDIAC SAMPLING AND PRESSURE, LEFT HEART, PERCUTANEOUS APPROACH: ICD-10-PCS | Performed by: INTERNAL MEDICINE

## 2017-07-17 PROCEDURE — 84484 ASSAY OF TROPONIN QUANT: CPT | Performed by: HOSPITALIST

## 2017-07-17 PROCEDURE — 83735 ASSAY OF MAGNESIUM: CPT | Performed by: HOSPITALIST

## 2017-07-17 PROCEDURE — 82962 GLUCOSE BLOOD TEST: CPT

## 2017-07-17 PROCEDURE — 85025 COMPLETE CBC W/AUTO DIFF WBC: CPT | Performed by: HOSPITALIST

## 2017-07-17 PROCEDURE — 80048 BASIC METABOLIC PNL TOTAL CA: CPT | Performed by: HOSPITALIST

## 2017-07-17 PROCEDURE — 93010 ELECTROCARDIOGRAM REPORT: CPT | Performed by: HOSPITALIST

## 2017-07-17 PROCEDURE — B2111ZZ FLUOROSCOPY OF MULTIPLE CORONARY ARTERIES USING LOW OSMOLAR CONTRAST: ICD-10-PCS | Performed by: INTERNAL MEDICINE

## 2017-07-17 RX ORDER — SODIUM CHLORIDE 9 MG/ML
INJECTION, SOLUTION INTRAVENOUS
Status: COMPLETED
Start: 2017-07-17 | End: 2017-07-17

## 2017-07-17 RX ORDER — ASPIRIN 81 MG/1
324 TABLET, CHEWABLE ORAL ONCE
Status: COMPLETED | OUTPATIENT
Start: 2017-07-17 | End: 2017-07-17

## 2017-07-17 RX ORDER — CHLORHEXIDINE GLUCONATE 4 G/100ML
30 SOLUTION TOPICAL
Status: ACTIVE | OUTPATIENT
Start: 2017-07-18 | End: 2017-07-18

## 2017-07-17 RX ORDER — MAGNESIUM OXIDE 400 MG (241.3 MG MAGNESIUM) TABLET
400 TABLET ONCE
Status: COMPLETED | OUTPATIENT
Start: 2017-07-17 | End: 2017-07-17

## 2017-07-17 RX ORDER — HEPARIN SODIUM 1000 [USP'U]/ML
INJECTION, SOLUTION INTRAVENOUS; SUBCUTANEOUS
Status: COMPLETED
Start: 2017-07-17 | End: 2017-07-17

## 2017-07-17 RX ORDER — SODIUM CHLORIDE 9 MG/ML
INJECTION, SOLUTION INTRAVENOUS CONTINUOUS
Status: DISCONTINUED | OUTPATIENT
Start: 2017-07-17 | End: 2017-07-22

## 2017-07-17 RX ORDER — SODIUM CHLORIDE 9 MG/ML
INJECTION, SOLUTION INTRAVENOUS CONTINUOUS
Status: ACTIVE | OUTPATIENT
Start: 2017-07-17 | End: 2017-07-17

## 2017-07-17 RX ORDER — LIDOCAINE HYDROCHLORIDE 20 MG/ML
INJECTION, SOLUTION EPIDURAL; INFILTRATION; INTRACAUDAL; PERINEURAL
Status: COMPLETED
Start: 2017-07-17 | End: 2017-07-17

## 2017-07-17 RX ORDER — MIDAZOLAM HYDROCHLORIDE 1 MG/ML
INJECTION INTRAMUSCULAR; INTRAVENOUS
Status: COMPLETED
Start: 2017-07-17 | End: 2017-07-17

## 2017-07-17 RX ORDER — HEPARIN SODIUM 1000 [USP'U]/ML
30 INJECTION, SOLUTION INTRAVENOUS; SUBCUTANEOUS ONCE
Status: COMPLETED | OUTPATIENT
Start: 2017-07-17 | End: 2017-07-17

## 2017-07-17 RX ORDER — SODIUM CHLORIDE 9 MG/ML
1 INJECTION, SOLUTION INTRAVENOUS CONTINUOUS
Status: DISCONTINUED | OUTPATIENT
Start: 2017-07-17 | End: 2017-07-22

## 2017-07-17 NOTE — PLAN OF CARE
Received pt from ER at [address]. Pt has no complaints of CP. Admission and med rec complete. Dr Melody Friedman paged for critical troponin of 0.12. Endorsed to [employer].      Awaiting cardiology consult

## 2017-07-17 NOTE — PROGRESS NOTES
ASSESSMENT/PLAN:     impression: 1. Acute coronary syndrome in a 62-year-old female with a known nonischemic cardiomyopathy and borderline coronary disease. Her January cath films were reviewed. 2.  Pulmonary hypertension by cath in January 2017. cholesterol    • Hyperlipidemia    • Ischemic cardiomyopathy 2/27/2017   • Neuropathy (HCC)    • Thyroid disease        Family History   Problem Relation Age of Onset   • Heart Disorder Father 61     MI tob   • Hypertension Father    • Lipids Father    • H peripheral edema. LABORATORY DATA:   EKG reviewed and compared to previous EKGs.   Labs reviewed:    Jose Eduardo Anne MD

## 2017-07-17 NOTE — PROGRESS NOTES
Post Cardiac Cath. Report called to [facility]. Right groin intact without hematoma or active bleeding. Transferred to cath lab holding room 3. Hand off to Holding room RUY Reid.

## 2017-07-17 NOTE — PROGRESS NOTES
[facility] Hospitalist Progress Note     CC: Hospital Follow up    PCP: Clotilde Chun MD       Assessment/Plan:     Principal Problem:    Acute chest pain  Active Problems:    Ischemic cardiomyopathy    Anemia    Acute kidney injury ([facility] [address]    Azotemia    Ms. Hemalatha Levy understanding and agreeing with therapeutic plan as outlined  Seven Fontaine MD  [facility] Hospitalist  Answering Service number: [phone]     Subjective:     No CP or shortness of breath.  Still with foot pain     OBJECTIVE:    Blood pressure 154/68, p input(s): ALT, AST, ALB, AMYLASE, LIPASE, LDH in the last 72 hours. Invalid input(s): ALPHOS, TBIL, DBIL, TPROT      Imaging:  Xr Chest Pa + Lat Chest (ata=92487)    Result Date: 7/16/2017  CONCLUSION: Normal examination.           Xr Foot, Complete (min

## 2017-07-18 ENCOUNTER — APPOINTMENT (OUTPATIENT)
Dept: ULTRASOUND IMAGING | Facility: HOSPITAL | Age: 57
DRG: 234 | End: 2017-07-18
Attending: NURSE PRACTITIONER
Payer: MEDICAID

## 2017-07-18 LAB
ANION GAP SERPL CALC-SCNC: 10 MMOL/L (ref 0–18)
APTT PPP: 36.3 SECONDS (ref 23.2–35.3)
APTT PPP: 52.7 SECONDS (ref 23.2–35.3)
BACTERIA UR QL AUTO: NEGATIVE /HPF
BILIRUB UR QL: NEGATIVE
BUN SERPL-MCNC: 35 MG/DL (ref 8–20)
BUN/CREAT SERPL: 21.6 (ref 10–20)
CALCIUM SERPL-MCNC: 9.2 MG/DL (ref 8.5–10.5)
CHLORIDE SERPL-SCNC: 100 MMOL/L (ref 95–110)
CLARITY UR: CLEAR
CO2 SERPL-SCNC: 27 MMOL/L (ref 22–32)
COLOR UR: YELLOW
CREAT SERPL-MCNC: 1.62 MG/DL (ref 0.5–1.5)
ERYTHROCYTE [DISTWIDTH] IN BLOOD BY AUTOMATED COUNT: 13.7 % (ref 11–15)
GLUCOSE BLDC GLUCOMTR-MCNC: 144 MG/DL (ref 70–99)
GLUCOSE BLDC GLUCOMTR-MCNC: 150 MG/DL (ref 70–99)
GLUCOSE BLDC GLUCOMTR-MCNC: 157 MG/DL (ref 70–99)
GLUCOSE BLDC GLUCOMTR-MCNC: 184 MG/DL (ref 70–99)
GLUCOSE SERPL-MCNC: 157 MG/DL (ref 70–99)
GLUCOSE UR-MCNC: NEGATIVE MG/DL
HCT VFR BLD AUTO: 33.4 % (ref 35–48)
HGB BLD-MCNC: 11.2 G/DL (ref 12–16)
HGB UR QL STRIP.AUTO: NEGATIVE
KETONES UR-MCNC: NEGATIVE MG/DL
MAGNESIUM SERPL-MCNC: 1.8 MG/DL (ref 1.8–2.5)
MCH RBC QN AUTO: 31.8 PG (ref 27–32)
MCHC RBC AUTO-ENTMCNC: 33.7 G/DL (ref 32–37)
MCV RBC AUTO: 94.5 FL (ref 80–100)
NITRITE UR QL STRIP.AUTO: NEGATIVE
OSMOLALITY UR CALC.SUM OF ELEC: 295 MOSM/KG (ref 275–295)
PH UR: 7 [PH] (ref 5–8)
PLATELET # BLD AUTO: 206 K/UL (ref 140–400)
PMV BLD AUTO: 8.7 FL (ref 7.4–10.3)
POTASSIUM SERPL-SCNC: 4.1 MMOL/L (ref 3.3–5.1)
PROT UR-MCNC: 30 MG/DL
RBC # BLD AUTO: 3.53 M/UL (ref 3.7–5.4)
RBC #/AREA URNS AUTO: 3 /HPF
SODIUM SERPL-SCNC: 137 MMOL/L (ref 136–144)
SP GR UR STRIP: 1.02 (ref 1–1.03)
UROBILINOGEN UR STRIP-ACNC: <2
VIT C UR-MCNC: NEGATIVE MG/DL
WBC # BLD AUTO: 8.2 K/UL (ref 4–11)
WBC #/AREA URNS AUTO: 3 /HPF

## 2017-07-18 PROCEDURE — 85027 COMPLETE CBC AUTOMATED: CPT | Performed by: HOSPITALIST

## 2017-07-18 PROCEDURE — 87081 CULTURE SCREEN ONLY: CPT | Performed by: NURSE PRACTITIONER

## 2017-07-18 PROCEDURE — 85730 THROMBOPLASTIN TIME PARTIAL: CPT | Performed by: INTERNAL MEDICINE

## 2017-07-18 PROCEDURE — 83735 ASSAY OF MAGNESIUM: CPT | Performed by: HOSPITALIST

## 2017-07-18 PROCEDURE — 82962 GLUCOSE BLOOD TEST: CPT

## 2017-07-18 PROCEDURE — 80048 BASIC METABOLIC PNL TOTAL CA: CPT | Performed by: HOSPITALIST

## 2017-07-18 PROCEDURE — 93880 EXTRACRANIAL BILAT STUDY: CPT | Performed by: NURSE PRACTITIONER

## 2017-07-18 PROCEDURE — 81001 URINALYSIS AUTO W/SCOPE: CPT | Performed by: NURSE PRACTITIONER

## 2017-07-18 PROCEDURE — 94667 MNPJ CHEST WALL 1ST: CPT

## 2017-07-18 RX ORDER — MAGNESIUM OXIDE 400 MG (241.3 MG MAGNESIUM) TABLET
400 TABLET ONCE
Status: COMPLETED | OUTPATIENT
Start: 2017-07-18 | End: 2017-07-18

## 2017-07-18 RX ORDER — HEPARIN SODIUM 1000 [USP'U]/ML
30 INJECTION, SOLUTION INTRAVENOUS; SUBCUTANEOUS ONCE
Status: COMPLETED | OUTPATIENT
Start: 2017-07-18 | End: 2017-07-18

## 2017-07-18 RX ORDER — ASCORBIC ACID 500 MG
1000 TABLET ORAL ONCE
Status: COMPLETED | OUTPATIENT
Start: 2017-07-19 | End: 2017-07-19

## 2017-07-18 NOTE — PROGRESS NOTES
Misc. Note  Patient seen by Dr. Junaid Garcia CV surgeon this am; will begin pre-op testing for possible open heart later this week. Podiatry to see patient for left foot cellulitis; spoke with Dr. Nguyen Sanon who will see patient later this afternoon.  Dr. Nguyen Sanon a

## 2017-07-18 NOTE — CONSULTS
[facility]    Report of Consultation    Agustin Carter Patient Status:  Inpatient    1960 MRN [id number]   Location [facility] 3W/SW Attending Caesar Solano MD   Hosp Day # 2 PCP Yvonne Humphries MD     Date of Admission: [facility])    • High blood pressure    • High cholesterol    • Hyperlipidemia    • Ischemic cardiomyopathy 2/27/2017   • Neuropathy ([facility] [address]    • Thyroid disease        Past Surgical History  Past Surgical History:  No date: AMPUTATION FOOT,TRANSMETATARSAL  No ema (LIPITOR) tab 40 mg 40 mg Oral Nightly   furosemide (LASIX) tab 20 mg 20 mg Oral Nightly   Levothyroxine Sodium (SYNTHROID) tab 50 mcg 50 mcg Oral Before breakfast   lisinopril (PRINIVIL,ZESTRIL) tab 40 mg 40 mg Oral Nightly   Metoprolol Succinate ER (Topr shifts: I/O last 3 completed shifts: In: 65 [P.O.:840; I.V.:148]  Out: 1975 [Urine:1975]   This shift: No intake/output data recorded.      Vent Settings:      Hemodynamic parameters (last 24 hours):      Scheduled Meds:   • magnesium oxide  400 mg Oral O Data:    Lab Results  Component Value Date   WBC 8.2 07/18/2017   HGB 11.2 (L) 07/18/2017   HCT 33.4 (L) 07/18/2017    07/18/2017   CREATSERUM 1.62 (H) 07/18/2017   BUN 35 (H) 07/18/2017    07/18/2017   K 4.1 07/18/2017    07/18/2017   C infarction stroke and death. I explained that she has an increased risk for complications because of her poor left ventricular function, her chronic kidney disease, and her limited mobility.   I answered all her questions and she agrees to proceed with geeta

## 2017-07-18 NOTE — PROGRESS NOTES
Pharmacy was consulted to dose Ancef (cefazolin) for treatment of cellulitis by Dr. Dakotah Phillip. Body mass index is 27.39 kg/m².   Wt Readings from Last 6 Encounters:  07/18/17 : 154 lb 9.6 oz  06/06/17 : 153 lb  05/02/17 : 150 lb  03/17/17 : 148 lb  03/15/17

## 2017-07-18 NOTE — PROGRESS NOTES
Misc. Note  Met with patient to discuss open heart surgery for Thursday 7/20/17 with Dr. Junior Tarango. Open heart binder given and reviewed with patient pre and post-op open heart course. Discussed STS score.   Questions answered consents signed and placed in ch

## 2017-07-18 NOTE — PROGRESS NOTES
[facility] Hospitalist Progress Note     CC: Hospital Follow up    PCP: Mylene Osgood, MD       Assessment/Plan:   Ms. Roger Billy is a 61year old female with PMH sig for CAD s/p NSTEMI 1/2017, CHF (EF 35%) HLD, HTN, DM with neuropathy, hs of congential hand and lady Hospitalist  Answering Service number: [phone]     Subjective:     No CP or shortness of breath. Still with foot pain     OBJECTIVE:    Blood pressure 136/65, pulse 77, temperature 98.3 °F (36.8 °C), temperature source Oral, resp.  rate 17, height 160 AST, ALB, AMYLASE, LIPASE, LDH in the last 72 hours. Invalid input(s): ALPHOS, TBIL, DBIL, TPROT      Imaging:  Xr Chest Pa + Lat Chest ([id number])    Result Date: 7/16/2017  CONCLUSION: Normal examination.           Xr Foot, Complete (min 3 Views), Left

## 2017-07-18 NOTE — PROGRESS NOTES
ASSESSMENT/PLAN:     impression:   1. Acute coronary syndrome with multi vessel cad on cath and lv dysfunction  2. Pulmonary hypertension by cath in January 2017. 3.  Hypertension. 4.  Elevated cholesterol.   5.   cki    Recommendation:   cabg  Revie RESP:denies chronic cough, hemoptysis. GI:denies melena, hematochezia. :denies hematuria. INTEG:no new rashes. MS:no limiting arthritis. NEURO:no localized deficits. HEME/LYMPH:denies easy bruising. ALL:no new allergies.  ROS otherwise negative except as

## 2017-07-19 ENCOUNTER — APPOINTMENT (OUTPATIENT)
Dept: MRI IMAGING | Facility: HOSPITAL | Age: 57
DRG: 234 | End: 2017-07-19
Attending: PODIATRIST
Payer: MEDICAID

## 2017-07-19 ENCOUNTER — ANESTHESIA EVENT (OUTPATIENT)
Dept: SURGERY | Facility: HOSPITAL | Age: 57
DRG: 234 | End: 2017-07-19
Payer: MEDICAID

## 2017-07-19 LAB
ANION GAP SERPL CALC-SCNC: 9 MMOL/L (ref 0–18)
ANTIBODY SCREEN: NEGATIVE
APTT PPP: 46.1 SECONDS (ref 23.2–35.3)
APTT PPP: 46.2 SECONDS (ref 23.2–35.3)
APTT PPP: 52.8 SECONDS (ref 23.2–35.3)
BASOPHILS # BLD: 0.1 K/UL (ref 0–0.2)
BASOPHILS NFR BLD: 1 %
BUN SERPL-MCNC: 30 MG/DL (ref 8–20)
BUN/CREAT SERPL: 19 (ref 10–20)
CALCIUM SERPL-MCNC: 9.8 MG/DL (ref 8.5–10.5)
CHLORIDE SERPL-SCNC: 102 MMOL/L (ref 95–110)
CO2 SERPL-SCNC: 26 MMOL/L (ref 22–32)
CREAT SERPL-MCNC: 1.58 MG/DL (ref 0.5–1.5)
EOSINOPHIL # BLD: 0.2 K/UL (ref 0–0.7)
EOSINOPHIL NFR BLD: 3 %
ERYTHROCYTE [DISTWIDTH] IN BLOOD BY AUTOMATED COUNT: 13.4 % (ref 11–15)
GLUCOSE BLDC GLUCOMTR-MCNC: 160 MG/DL (ref 70–99)
GLUCOSE BLDC GLUCOMTR-MCNC: 178 MG/DL (ref 70–99)
GLUCOSE BLDC GLUCOMTR-MCNC: 208 MG/DL (ref 70–99)
GLUCOSE BLDC GLUCOMTR-MCNC: 220 MG/DL (ref 70–99)
GLUCOSE SERPL-MCNC: 165 MG/DL (ref 70–99)
HCT VFR BLD AUTO: 31.7 % (ref 35–48)
HGB BLD-MCNC: 10.9 G/DL (ref 12–16)
INR BLD: 1 (ref 0.9–1.2)
LYMPHOCYTES # BLD: 1.9 K/UL (ref 1–4)
LYMPHOCYTES NFR BLD: 25 %
MAGNESIUM SERPL-MCNC: 1.8 MG/DL (ref 1.8–2.5)
MCH RBC QN AUTO: 32.4 PG (ref 27–32)
MCHC RBC AUTO-ENTMCNC: 34.3 G/DL (ref 32–37)
MCV RBC AUTO: 94.5 FL (ref 80–100)
MONOCYTES # BLD: 0.8 K/UL (ref 0–1)
MONOCYTES NFR BLD: 12 %
NEUTROPHILS # BLD AUTO: 4.4 K/UL (ref 1.8–7.7)
NEUTROPHILS NFR BLD: 59 %
OSMOLALITY UR CALC.SUM OF ELEC: 294 MOSM/KG (ref 275–295)
PLATELET # BLD AUTO: 202 K/UL (ref 140–400)
PMV BLD AUTO: 9.1 FL (ref 7.4–10.3)
POTASSIUM SERPL-SCNC: 5.1 MMOL/L (ref 3.3–5.1)
PROTHROMBIN TIME: 12.6 SECONDS (ref 11.8–14.5)
RBC # BLD AUTO: 3.36 M/UL (ref 3.7–5.4)
RH BLOOD TYPE: POSITIVE
SODIUM SERPL-SCNC: 137 MMOL/L (ref 136–144)
WBC # BLD AUTO: 7.4 K/UL (ref 4–11)

## 2017-07-19 PROCEDURE — 86850 RBC ANTIBODY SCREEN: CPT | Performed by: NURSE PRACTITIONER

## 2017-07-19 PROCEDURE — 86901 BLOOD TYPING SEROLOGIC RH(D): CPT | Performed by: NURSE PRACTITIONER

## 2017-07-19 PROCEDURE — 86920 COMPATIBILITY TEST SPIN: CPT

## 2017-07-19 PROCEDURE — 85610 PROTHROMBIN TIME: CPT | Performed by: NURSE PRACTITIONER

## 2017-07-19 PROCEDURE — 80048 BASIC METABOLIC PNL TOTAL CA: CPT | Performed by: HOSPITALIST

## 2017-07-19 PROCEDURE — 83735 ASSAY OF MAGNESIUM: CPT | Performed by: HOSPITALIST

## 2017-07-19 PROCEDURE — 73718 MRI LOWER EXTREMITY W/O DYE: CPT | Performed by: PODIATRIST

## 2017-07-19 PROCEDURE — 85730 THROMBOPLASTIN TIME PARTIAL: CPT | Performed by: INTERNAL MEDICINE

## 2017-07-19 PROCEDURE — 86900 BLOOD TYPING SEROLOGIC ABO: CPT | Performed by: NURSE PRACTITIONER

## 2017-07-19 PROCEDURE — 85025 COMPLETE CBC W/AUTO DIFF WBC: CPT | Performed by: HOSPITALIST

## 2017-07-19 PROCEDURE — 82962 GLUCOSE BLOOD TEST: CPT

## 2017-07-19 PROCEDURE — 85730 THROMBOPLASTIN TIME PARTIAL: CPT | Performed by: HOSPITALIST

## 2017-07-19 RX ORDER — MAGNESIUM OXIDE 400 MG (241.3 MG MAGNESIUM) TABLET
400 TABLET ONCE
Status: COMPLETED | OUTPATIENT
Start: 2017-07-19 | End: 2017-07-19

## 2017-07-19 NOTE — CONSULTS
[facility]  Report of Consultation    Laura Hill Patient Status:  Inpatient    1960 MRN [id number]   Location [facility] 3W/SW Attending Bianca Paulson,    Hosp Day # 2 PCP Eber Marti MD     SUBJECTIVE:  Patient complaining of p date:  CARPAL TUNNEL RELEASE  No date:       Comment: x1 ; first pregnancy     Smoking status: Former Smoker  1.00 Packs/day  For 14.00 Years     Types: Cigarettes    Quit date: 1999    Smokeless tobacco: Never Used    Alcohol use No     Family His metatarsal. A 1.1 cm ossification adjacent to the base of the fifth metatarsal may be due to an unfused apophysis. It does not appear acute. There are hammertoe deformities. There has been   resection of or absence of the distal first phalanx.    SOFT TISSU swelling    Patient is afebrile with a normal white count. ASSESSMENT:    1. Localized inflammation to the second met cuneiform joint undetermined etiology at this time  2.  Patient would have a very difficult time ambulating in her left foot during he

## 2017-07-19 NOTE — PROGRESS NOTES
ASSESSMENT/PLAN:     impression:   1. Acute coronary syndrome with multi vessel cad on cath and lv dysfunction  2. Pulmonary hypertension by cath in January 2017. Echo in June 2017 pulm htn resolved  and ef improved  3. Hypertension.   4.  Elevated c REVIEW OF SYSTEMS:   CONS:denies fever, chills, significant weight change. CV:see HPI. RESP:denies chronic cough, hemoptysis. GI:denies melena, hematochezia. :denies hematuria. INTEG:no new rashes. MS:no limiting arthritis.  NEURO:no localized deficit

## 2017-07-19 NOTE — PROGRESS NOTES
[facility] Hospitalist Progress Note     CC: Hospital Follow up    PCP: Melva Madison MD       Assessment/Plan:   Ms. Natalie Godfrey is a 61year old female with PMH sig for CAD s/p NSTEMI 1/2017, CHF (EF 35%) HLD, HTN, DM with neuropathy, hs of congential hand and lady to follow patient while in house     Patient and/or patient's family given opportunity to ask questions and note understanding and agreeing with therapeutic plan as outlined     Domingo Cadena DO  [facility] Hospitalist  Answering Service number: [phone]     Quick 13.7  13.4   WBC  8.6  8.2  7.4   PLT  207  206  202         Recent Labs   Lab  07/17/17   0542  07/18/17   0317  07/19/17   0647   GLU  129*  157*  165*   BUN  40*  35*  30*   CREATSERUM  1.74*  1.62*  1.58*   GFRAA  36*  39*  41*   GFRNAA  30*  33*  34* Succinate ER  25 mg Oral Nightly   • spironolactone  25 mg Oral Nightly     • sodium bicarbonate 150 mEq/ D5W 850 mL infusion     • sodium chloride Stopped (07/18/17 0738)   • sodium chloride Stopped (07/18/17 0738)   • sodium bicarbonate 150 mEq/ D5W 850

## 2017-07-19 NOTE — ANESTHESIA PREPROCEDURE EVALUATION
Anesthesia PreOp Note    HPI:     Lam Lundy is a 61year old female who presents for preoperative consultation requested by: Pao Valdez MD    Date of Surgery: 7/20/2017    Procedure(s):   HEART CORONARY ARTERY BYPASS GRAFT  Indication: coronary [facility])    • High blood pressure    • High cholesterol    • Hyperlipidemia    • Ischemic cardiomyopathy 2017   • Neuropathy ([facility] [address]    • Thyroid disease        Past Surgical History:  No date: AMPUTATION FOOT,TRANSMETATARSAL  No date:       Comme g Intravenous On Call Lorenzo Enriquez NP     Vitamin C (VITAMIN C) tab 1,000 mg 1,000 mg Oral Once Lorenzo Enriquez NP     sodium bicarbonate injection 35.05 mEq 0.5 mEq/kg Intravenous Once Niyah Gan NP     Followed by         sodium bicarbonate spironolactone (ALDACTONE) tab 25 mg 25 mg Oral Nightly Clotilde Trinh MD 25 mg at 07/18/17 2126      No current [facility]-ordered outpatient prescriptions on file.     No Known Allergies    Family History   Problem Relation Age of Onset   • Heart Disorder 05/18/2017    (H) 07/19/2017    (H) 05/18/2017   CA 9.8 07/19/2017   CA 10.3 05/18/2017       Lab Results  Component Value Date   INR 1.0 07/19/2017       Vital Signs:   There is no height or weight on file to calculate BMI.   vitals were not With:  Patient  Use of Blood Products Discussed With:  Patient  Blood Product Use Consented        I have informed Scar Vaughn  of the nature of the anesthetic plan, benefits, risks, major complications, and any alternative forms of anesthetic management

## 2017-07-19 NOTE — PROGRESS NOTES
[facility]                                                            Progress Note    Josesito Casper Patient Status:  Inpatient    1960 MRN [id number]   Location [facility] 3W/SW Attending Vinnie Ledesma, [address] [facility] Day # 3 PCP Airam Duran infusion, 1 mL/kg/hr, Intravenous, Continuous  •  Normal Saline Flush 0.9 % injection 3 mL, 3 mL, Intravenous, PRN  •  ondansetron HCl (ZOFRAN) injection 4 mg, 4 mg, Intravenous, Q6H PRN  •  acetaminophen (TYLENOL) tab 650 mg, 650 mg, Oral, Q6H PRN  •  dex INR 1.0 07/19/2017   PTP 12.6 07/19/2017   MG 1.8 07/19/2017   PGLU 178 07/19/2017     CONCLUSION:    X-rays of the left foot         1. No acute fracture dislocation. Left midfoot degenerative changes redemonstrated.   2. Partial amputation first through hospital switch to oral cephlosporin on discharge but completed 10 day course  Would have patient follow-up with Dr. Johann Nath at his [place] office when she is recovered from upcoming surgery for long-term management of her diabetic foot condition.

## 2017-07-19 NOTE — PROGRESS NOTES
07/19/17 1630   Clinical Encounter Type   Visited With Patient   Routine Visit Introduction   Continue Visiting No   Surgical Visit Pre-op   Patient's Supportive Strategies/Resources Nyla    Patient Spiritual Encounters   Spiritual Assessment Completed

## 2017-07-19 NOTE — DISCHARGE PLANNING
7/19/17 CM Discharge planning   Pt ebony for CV surgery 7/20. Met with pt, resides with parents and dtr on first level of family home. Pt reports independent prior to admission.   Pt ebony for CV surgery 7/20, CM will continue to follow and assess for dischar

## 2017-07-20 ENCOUNTER — ANESTHESIA (OUTPATIENT)
Dept: SURGERY | Facility: HOSPITAL | Age: 57
DRG: 234 | End: 2017-07-20
Payer: MEDICAID

## 2017-07-20 ENCOUNTER — SURGERY (OUTPATIENT)
Age: 57
End: 2017-07-20

## 2017-07-20 ENCOUNTER — APPOINTMENT (OUTPATIENT)
Dept: GENERAL RADIOLOGY | Facility: HOSPITAL | Age: 57
DRG: 234 | End: 2017-07-20
Attending: NURSE PRACTITIONER
Payer: MEDICAID

## 2017-07-20 LAB
ANION GAP SERPL CALC-SCNC: 10 MMOL/L (ref 0–18)
ANION GAP SERPL CALC-SCNC: 10 MMOL/L (ref 0–18)
APTT PPP: 30.9 SECONDS (ref 23.2–35.3)
APTT PPP: 47.9 SECONDS (ref 23.2–35.3)
APTT PPP: 59.6 SECONDS (ref 23.2–35.3)
BASE EXCESS BLD CALC-SCNC: -0.8 MMOL/L (ref ?–2)
BASE EXCESS BLD CALC-SCNC: -0.9 MMOL/L (ref ?–2)
BASE EXCESS BLD CALC-SCNC: -1.7 MMOL/L (ref ?–2)
BASE EXCESS BLD CALC-SCNC: 0.8 MMOL/L (ref ?–2)
BASE EXCESS BLD CALC-SCNC: 0.9 MMOL/L (ref ?–2)
BASE EXCESS BLD CALC-SCNC: 4 MMOL/L (ref ?–2)
BASOPHILS # BLD: 0.1 K/UL (ref 0–0.2)
BASOPHILS NFR BLD: 1 %
BUN SERPL-MCNC: 28 MG/DL (ref 8–20)
BUN SERPL-MCNC: 34 MG/DL (ref 8–20)
BUN/CREAT SERPL: 19 (ref 10–20)
BUN/CREAT SERPL: 22.7 (ref 10–20)
CA-I BLD-SCNC: 1.07 MMOL/L (ref 0.95–1.32)
CA-I BLD-SCNC: 1.07 MMOL/L (ref 0.95–1.32)
CA-I BLD-SCNC: 1.08 MMOL/L (ref 0.95–1.32)
CA-I BLD-SCNC: 1.09 MMOL/L (ref 0.95–1.32)
CA-I BLD-SCNC: 1.11 MMOL/L (ref 0.95–1.32)
CA-I BLD-SCNC: 1.15 MMOL/L (ref 0.95–1.32)
CALCIUM SERPL-MCNC: 7.5 MG/DL (ref 8.5–10.5)
CALCIUM SERPL-MCNC: 9.8 MG/DL (ref 8.5–10.5)
CFT BLD TEG: 13.9 MIN (ref 5–10)
CFT P HPASE BLD TEG: 12.7 MIN (ref 5–10)
CFT P HPASE BLD TEG: 8.6 MIN (ref 5–10)
CHLORIDE SERPL-SCNC: 101 MMOL/L (ref 95–110)
CHLORIDE SERPL-SCNC: 106 MMOL/L (ref 95–110)
CLOT ANGLE BLD TEG: 65.2 DEG (ref 53–72)
CLOT ANGLE P HPASE BLD TEG: 60.1 DEG (ref 53–72)
CLOT ANGLE P HPASE BLD TEG: 72 DEG (ref 53–72)
CLOT LYSIS 30M P MA LENFR BLD TEG: 0.2 % (ref 0–8)
CLOT LYSIS 30M P MA LENFR BLD TEG: 1.9 % (ref 0–8)
CLOT LYSIS ESTIMATE PRCTL BLD TEG: 13.1 MIN
CLOT STRENGTH BLD TEG: 1.9 MIN (ref 1–3)
CLOT STRENGTH BLD TEG: 10.8 KD/SC (ref 4.5–11)
CLOT STRENGTH P HPASE BLD TEG: 1.2 MIN (ref 1–3)
CLOT STRENGTH P HPASE BLD TEG: 11.4 MIN
CLOT STRENGTH P HPASE BLD TEG: 15.8 KD/SC (ref 4.5–11)
CLOT STRENGTH P HPASE BLD TEG: 2.2 MIN (ref 1–3)
CLOT STRENGTH P HPASE BLD TEG: 7.8 MIN
CLOT STRENGTH P HPASE BLD TEG: 9 KD/SC (ref 4.5–11)
CO2 SERPL-SCNC: 24 MMOL/L (ref 22–32)
CO2 SERPL-SCNC: 27 MMOL/L (ref 22–32)
COHGB MFR BLD: <1.5 % (ref 0–1.5)
CREAT SERPL-MCNC: 1.47 MG/DL (ref 0.5–1.5)
CREAT SERPL-MCNC: 1.5 MG/DL (ref 0.5–1.5)
EOSINOPHIL # BLD: 0.3 K/UL (ref 0–0.7)
EOSINOPHIL NFR BLD: 4 %
ERYTHROCYTE [DISTWIDTH] IN BLOOD BY AUTOMATED COUNT: 13.6 % (ref 11–15)
ERYTHROCYTE [DISTWIDTH] IN BLOOD BY AUTOMATED COUNT: 14.2 % (ref 11–15)
FIBRINOGEN PPP-MCNC: 338 MG/DL (ref 176–491)
GLUCOSE BLDC GLUCOMTR-MCNC: 148 MG/DL (ref 70–99)
GLUCOSE BLDC GLUCOMTR-MCNC: 155 MG/DL (ref 70–99)
GLUCOSE BLDC GLUCOMTR-MCNC: 161 MG/DL (ref 70–99)
GLUCOSE BLDC GLUCOMTR-MCNC: 161 MG/DL (ref 70–99)
GLUCOSE BLDC GLUCOMTR-MCNC: 167 MG/DL (ref 70–99)
GLUCOSE BLDC GLUCOMTR-MCNC: 171 MG/DL (ref 70–99)
GLUCOSE BLDC GLUCOMTR-MCNC: 174 MG/DL (ref 70–99)
GLUCOSE BLDC GLUCOMTR-MCNC: 176 MG/DL (ref 70–99)
GLUCOSE BLDC GLUCOMTR-MCNC: 178 MG/DL (ref 70–99)
GLUCOSE BLDC GLUCOMTR-MCNC: 190 MG/DL (ref 70–99)
GLUCOSE BLDC GLUCOMTR-MCNC: 206 MG/DL (ref 70–99)
GLUCOSE BLDC GLUCOMTR-MCNC: 211 MG/DL (ref 70–99)
GLUCOSE BLDC GLUCOMTR-MCNC: 219 MG/DL (ref 70–99)
GLUCOSE BLDC GLUCOMTR-MCNC: 223 MG/DL (ref 70–99)
GLUCOSE BLDC GLUCOMTR-MCNC: 227 MG/DL (ref 70–99)
GLUCOSE SERPL-MCNC: 148 MG/DL (ref 70–99)
GLUCOSE SERPL-MCNC: 170 MG/DL (ref 70–99)
HCO3 BLDA-SCNC: 22 MEQ/L (ref 21–27)
HCO3 BLDA-SCNC: 23.5 MEQ/L (ref 21–27)
HCO3 BLDA-SCNC: 23.7 MEQ/L (ref 21–27)
HCO3 BLDA-SCNC: 25.3 MEQ/L (ref 21–27)
HCO3 BLDA-SCNC: 25.3 MEQ/L (ref 21–27)
HCO3 BLDA-SCNC: 26.5 MEQ/L (ref 21–27)
HCT VFR BLD AUTO: 31.3 % (ref 35–48)
HCT VFR BLD AUTO: 33.3 % (ref 35–48)
HGB BLD-MCNC: 10.5 G/DL (ref 12–16)
HGB BLD-MCNC: 10.8 G/DL (ref 12–16)
HGB BLD-MCNC: 11 G/DL (ref 12–16)
HGB BLD-MCNC: 11.1 G/DL (ref 12–16)
HGB BLD-MCNC: 5.8 G/DL (ref 12–16)
HGB BLD-MCNC: 7.4 G/DL (ref 12–16)
HGB BLD-MCNC: 7.5 G/DL (ref 12–16)
HGB BLD-MCNC: 9.5 G/DL (ref 12–16)
INR BLD: 1.6 (ref 0.9–1.2)
LYMPHOCYTES # BLD: 2 K/UL (ref 1–4)
LYMPHOCYTES NFR BLD: 23 %
MAGNESIUM SERPL-MCNC: 1.6 MG/DL (ref 1.8–2.5)
MAGNESIUM SERPL-MCNC: 2.7 MG/DL (ref 1.8–2.5)
MCF BLD TEG: 52.7 MM
MCF BLD TEG: 68.3 MM (ref 50–70)
MCF BLD TEG: 72.9 MM
MCF P HPASE BLD TEG: 64.2 MM (ref 50–70)
MCF P HPASE BLD TEG: 76 MM (ref 50–70)
MCH RBC QN AUTO: 30.9 PG (ref 27–32)
MCH RBC QN AUTO: 32.6 PG (ref 27–32)
MCHC RBC AUTO-ENTMCNC: 33.3 G/DL (ref 32–37)
MCHC RBC AUTO-ENTMCNC: 34.6 G/DL (ref 32–37)
MCV RBC AUTO: 93 FL (ref 80–100)
MCV RBC AUTO: 94.4 FL (ref 80–100)
METHGB MFR BLD: 0.3 % SAT (ref 0.4–1.5)
METHGB MFR BLD: 0.5 % SAT (ref 0.4–1.5)
METHGB MFR BLD: 0.6 % SAT (ref 0.4–1.5)
METHGB MFR BLD: 1.2 % SAT (ref 0.4–1.5)
METHGB MFR BLD: 1.2 % SAT (ref 0.4–1.5)
METHGB MFR BLD: 1.4 % SAT (ref 0.4–1.5)
MONOCYTES # BLD: 0.8 K/UL (ref 0–1)
MONOCYTES NFR BLD: 9 %
NEUTROPHILS # BLD AUTO: 5.6 K/UL (ref 1.8–7.7)
NEUTROPHILS NFR BLD: 64 %
O2 CT BLD-SCNC: 11 VOL% (ref 15–23)
O2 CT BLD-SCNC: 11.2 VOL% (ref 15–23)
O2 CT BLD-SCNC: 14.4 VOL% (ref 15–23)
O2 CT BLD-SCNC: 15.4 VOL% (ref 15–23)
O2 CT BLD-SCNC: 15.4 VOL% (ref 15–23)
O2 CT BLD-SCNC: 8.9 VOL% (ref 15–23)
O2/TOTAL GAS SETTING VFR VENT: 100 %
O2/TOTAL GAS SETTING VFR VENT: 40 %
OSMOLALITY UR CALC.SUM OF ELEC: 298 MOSM/KG (ref 275–295)
OSMOLALITY UR CALC.SUM OF ELEC: 298 MOSM/KG (ref 275–295)
PCO2 BLDA: 32 MM HG (ref 35–45)
PCO2 BLDA: 35 MM HG (ref 35–45)
PCO2 BLDA: 40 MM HG (ref 35–45)
PCO2 BLDA: 42 MM HG (ref 35–45)
PCO2 BLDA: 43 MM HG (ref 35–45)
PCO2 BLDA: 43 MM HG (ref 35–45)
PEEP SETTING VENT: 5 CM H2O
PEEP SETTING VENT: 5 CM H2O
PH BLDA: 7.37 [PH] (ref 7.35–7.45)
PH BLDA: 7.39 [PH] (ref 7.35–7.45)
PH BLDA: 7.41 [PH] (ref 7.35–7.45)
PH BLDA: 7.53 [PH] (ref 7.35–7.45)
PLATELET # BLD AUTO: 103 K/UL (ref 140–400)
PLATELET # BLD AUTO: 211 K/UL (ref 140–400)
PLATELET MAPPING % INHIBITION (AA): 46 %
PLATELET MAPPING % INHIBITION (ADP): 6 %
PMV BLD AUTO: 8.3 FL (ref 7.4–10.3)
PMV BLD AUTO: 8.8 FL (ref 7.4–10.3)
PO2 BLDA: 140 MM HG (ref 80–100)
PO2 BLDA: 295 MM HG (ref 80–100)
PO2 BLDA: 324 MM HG (ref 80–100)
PO2 BLDA: 329 MM HG (ref 80–100)
PO2 BLDA: 336 MM HG (ref 80–100)
PO2 BLDA: 482 MM HG (ref 80–100)
PO2 SATN ADJ TO 0.5 BLD: 23 MMHG (ref 24–28)
PO2 SATN ADJ TO 0.5 BLD: 26 MMHG (ref 24–28)
PO2 SATN ADJ TO 0.5 BLD: 27 MMHG (ref 24–28)
POTASSIUM (BLOOD GAS): 3.7 MMOL/L (ref 3.3–5.1)
POTASSIUM (BLOOD GAS): 4 MMOL/L (ref 3.3–5.1)
POTASSIUM (BLOOD GAS): 4.1 MMOL/L (ref 3.3–5.1)
POTASSIUM (BLOOD GAS): 4.4 MMOL/L (ref 3.3–5.1)
POTASSIUM (BLOOD GAS): 4.6 MMOL/L (ref 3.3–5.1)
POTASSIUM (BLOOD GAS): 5.5 MMOL/L (ref 3.3–5.1)
POTASSIUM SERPL-SCNC: 3.9 MMOL/L (ref 3.3–5.1)
POTASSIUM SERPL-SCNC: 4.4 MMOL/L (ref 3.3–5.1)
PRESSURE SUPPORT SETTING VENT: 5 CM H2O
PRESSURE SUPPORT SETTING VENT: 5 CM H2O
PROTHROMBIN TIME: 18.2 SECONDS (ref 11.8–14.5)
PUNCTURE CHARGE: NO
RBC # BLD AUTO: 3.31 M/UL (ref 3.7–5.4)
RBC # BLD AUTO: 3.58 M/UL (ref 3.7–5.4)
RESP RATE: 12 BPM
SAO2 % BLDA: >98.5 % (ref 94–100)
SODIUM (BLOOD GAS): 140 MMOL/L (ref 135–145)
SODIUM (BLOOD GAS): 141 MMOL/L (ref 135–145)
SODIUM (BLOOD GAS): 142 MMOL/L (ref 135–145)
SODIUM (BLOOD GAS): 142 MMOL/L (ref 135–145)
SODIUM (BLOOD GAS): 143 MMOL/L (ref 135–145)
SODIUM (BLOOD GAS): 145 MMOL/L (ref 135–145)
SODIUM SERPL-SCNC: 138 MMOL/L (ref 136–144)
SODIUM SERPL-SCNC: 140 MMOL/L (ref 136–144)
SPECIMEN VOL 24H UR: 500 ML
TSH SERPL-ACNC: 4.11 UIU/ML (ref 0.45–5.33)
WBC # BLD AUTO: 14.2 K/UL (ref 4–11)
WBC # BLD AUTO: 8.9 K/UL (ref 4–11)

## 2017-07-20 PROCEDURE — 82962 GLUCOSE BLOOD TEST: CPT

## 2017-07-20 PROCEDURE — 85730 THROMBOPLASTIN TIME PARTIAL: CPT | Performed by: NURSE PRACTITIONER

## 2017-07-20 PROCEDURE — 85347 COAGULATION TIME ACTIVATED: CPT | Performed by: THORACIC SURGERY (CARDIOTHORACIC VASCULAR SURGERY)

## 2017-07-20 PROCEDURE — 85384 FIBRINOGEN ACTIVITY: CPT | Performed by: NURSE PRACTITIONER

## 2017-07-20 PROCEDURE — P9047 ALBUMIN (HUMAN), 25%, 50ML: HCPCS

## 2017-07-20 PROCEDURE — 83735 ASSAY OF MAGNESIUM: CPT | Performed by: HOSPITALIST

## 2017-07-20 PROCEDURE — 30233N1 TRANSFUSION OF NONAUTOLOGOUS RED BLOOD CELLS INTO PERIPHERAL VEIN, PERCUTANEOUS APPROACH: ICD-10-PCS | Performed by: THORACIC SURGERY (CARDIOTHORACIC VASCULAR SURGERY)

## 2017-07-20 PROCEDURE — 93010 ELECTROCARDIOGRAM REPORT: CPT | Performed by: NURSE PRACTITIONER

## 2017-07-20 PROCEDURE — 85576 BLOOD PLATELET AGGREGATION: CPT | Performed by: THORACIC SURGERY (CARDIOTHORACIC VASCULAR SURGERY)

## 2017-07-20 PROCEDURE — 85730 THROMBOPLASTIN TIME PARTIAL: CPT | Performed by: INTERNAL MEDICINE

## 2017-07-20 PROCEDURE — 85018 HEMOGLOBIN: CPT | Performed by: THORACIC SURGERY (CARDIOTHORACIC VASCULAR SURGERY)

## 2017-07-20 PROCEDURE — B24BZZ4 ULTRASONOGRAPHY OF HEART WITH AORTA, TRANSESOPHAGEAL: ICD-10-PCS | Performed by: ANESTHESIOLOGY

## 2017-07-20 PROCEDURE — 85025 COMPLETE CBC W/AUTO DIFF WBC: CPT | Performed by: HOSPITALIST

## 2017-07-20 PROCEDURE — P9045 ALBUMIN (HUMAN), 5%, 250 ML: HCPCS

## 2017-07-20 PROCEDURE — 83735 ASSAY OF MAGNESIUM: CPT | Performed by: NURSE PRACTITIONER

## 2017-07-20 PROCEDURE — 84132 ASSAY OF SERUM POTASSIUM: CPT | Performed by: NURSE PRACTITIONER

## 2017-07-20 PROCEDURE — 84295 ASSAY OF SERUM SODIUM: CPT | Performed by: NURSE PRACTITIONER

## 2017-07-20 PROCEDURE — 82330 ASSAY OF CALCIUM: CPT | Performed by: NURSE PRACTITIONER

## 2017-07-20 PROCEDURE — 94660 CPAP INITIATION&MGMT: CPT

## 2017-07-20 PROCEDURE — P9045 ALBUMIN (HUMAN), 5%, 250 ML: HCPCS | Performed by: ANESTHESIOLOGY

## 2017-07-20 PROCEDURE — 83050 HGB METHEMOGLOBIN QUAN: CPT | Performed by: THORACIC SURGERY (CARDIOTHORACIC VASCULAR SURGERY)

## 2017-07-20 PROCEDURE — 82805 BLOOD GASES W/O2 SATURATION: CPT | Performed by: THORACIC SURGERY (CARDIOTHORACIC VASCULAR SURGERY)

## 2017-07-20 PROCEDURE — 85027 COMPLETE CBC AUTOMATED: CPT | Performed by: NURSE PRACTITIONER

## 2017-07-20 PROCEDURE — 84132 ASSAY OF SERUM POTASSIUM: CPT | Performed by: THORACIC SURGERY (CARDIOTHORACIC VASCULAR SURGERY)

## 2017-07-20 PROCEDURE — 36592 COLLECT BLOOD FROM PICC: CPT

## 2017-07-20 PROCEDURE — 85384 FIBRINOGEN ACTIVITY: CPT | Performed by: THORACIC SURGERY (CARDIOTHORACIC VASCULAR SURGERY)

## 2017-07-20 PROCEDURE — 80048 BASIC METABOLIC PNL TOTAL CA: CPT | Performed by: NURSE PRACTITIONER

## 2017-07-20 PROCEDURE — 80048 BASIC METABOLIC PNL TOTAL CA: CPT | Performed by: HOSPITALIST

## 2017-07-20 PROCEDURE — 84443 ASSAY THYROID STIM HORMONE: CPT | Performed by: NURSE PRACTITIONER

## 2017-07-20 PROCEDURE — 85730 THROMBOPLASTIN TIME PARTIAL: CPT | Performed by: HOSPITALIST

## 2017-07-20 PROCEDURE — 82330 ASSAY OF CALCIUM: CPT | Performed by: THORACIC SURGERY (CARDIOTHORACIC VASCULAR SURGERY)

## 2017-07-20 PROCEDURE — 93312 ECHO TRANSESOPHAGEAL: CPT | Performed by: ANESTHESIOLOGY

## 2017-07-20 PROCEDURE — 06BQ0ZZ EXCISION OF LEFT SAPHENOUS VEIN, OPEN APPROACH: ICD-10-PCS | Performed by: THORACIC SURGERY (CARDIOTHORACIC VASCULAR SURGERY)

## 2017-07-20 PROCEDURE — 02100Z9 BYPASS CORONARY ARTERY, ONE ARTERY FROM LEFT INTERNAL MAMMARY, OPEN APPROACH: ICD-10-PCS | Performed by: THORACIC SURGERY (CARDIOTHORACIC VASCULAR SURGERY)

## 2017-07-20 PROCEDURE — 94002 VENT MGMT INPAT INIT DAY: CPT

## 2017-07-20 PROCEDURE — A4216 STERILE WATER/SALINE, 10 ML: HCPCS

## 2017-07-20 PROCEDURE — 71010 XR CHEST AP PORTABLE  (CPT=71010): CPT | Performed by: NURSE PRACTITIONER

## 2017-07-20 PROCEDURE — 82375 ASSAY CARBOXYHB QUANT: CPT | Performed by: NURSE PRACTITIONER

## 2017-07-20 PROCEDURE — 85610 PROTHROMBIN TIME: CPT | Performed by: NURSE PRACTITIONER

## 2017-07-20 PROCEDURE — 5A1221Z PERFORMANCE OF CARDIAC OUTPUT, CONTINUOUS: ICD-10-PCS | Performed by: THORACIC SURGERY (CARDIOTHORACIC VASCULAR SURGERY)

## 2017-07-20 PROCEDURE — 83050 HGB METHEMOGLOBIN QUAN: CPT | Performed by: NURSE PRACTITIONER

## 2017-07-20 PROCEDURE — 84295 ASSAY OF SERUM SODIUM: CPT | Performed by: THORACIC SURGERY (CARDIOTHORACIC VASCULAR SURGERY)

## 2017-07-20 PROCEDURE — 021109W BYPASS CORONARY ARTERY, TWO ARTERIES FROM AORTA WITH AUTOLOGOUS VENOUS TISSUE, OPEN APPROACH: ICD-10-PCS | Performed by: THORACIC SURGERY (CARDIOTHORACIC VASCULAR SURGERY)

## 2017-07-20 PROCEDURE — 93005 ELECTROCARDIOGRAM TRACING: CPT

## 2017-07-20 PROCEDURE — 82805 BLOOD GASES W/O2 SATURATION: CPT | Performed by: NURSE PRACTITIONER

## 2017-07-20 PROCEDURE — 82375 ASSAY CARBOXYHB QUANT: CPT | Performed by: THORACIC SURGERY (CARDIOTHORACIC VASCULAR SURGERY)

## 2017-07-20 PROCEDURE — 5A1223Z PERFORMANCE OF CARDIAC PACING, CONTINUOUS: ICD-10-PCS | Performed by: THORACIC SURGERY (CARDIOTHORACIC VASCULAR SURGERY)

## 2017-07-20 PROCEDURE — 94668 MNPJ CHEST WALL SBSQ: CPT

## 2017-07-20 PROCEDURE — 94667 MNPJ CHEST WALL 1ST: CPT

## 2017-07-20 PROCEDURE — 85018 HEMOGLOBIN: CPT | Performed by: NURSE PRACTITIONER

## 2017-07-20 RX ORDER — PROTAMINE SULFATE 10 MG/ML
INJECTION, SOLUTION INTRAVENOUS AS NEEDED
Status: DISCONTINUED | OUTPATIENT
Start: 2017-07-20 | End: 2017-07-20 | Stop reason: SURG

## 2017-07-20 RX ORDER — PHENYLEPHRINE HCL 10 MG/ML
VIAL (ML) INJECTION AS NEEDED
Status: DISCONTINUED | OUTPATIENT
Start: 2017-07-20 | End: 2017-07-20 | Stop reason: SURG

## 2017-07-20 RX ORDER — CHLORHEXIDINE GLUCONATE 0.12 MG/ML
15 RINSE ORAL 2 TIMES DAILY
Status: DISCONTINUED | OUTPATIENT
Start: 2017-07-20 | End: 2017-07-22

## 2017-07-20 RX ORDER — ASCORBIC ACID 500 MG
500 TABLET ORAL 3 TIMES DAILY
Status: DISCONTINUED | OUTPATIENT
Start: 2017-07-21 | End: 2017-07-26

## 2017-07-20 RX ORDER — DOBUTAMINE HYDROCHLORIDE 100 MG/100ML
INJECTION INTRAVENOUS CONTINUOUS PRN
Status: DISCONTINUED | OUTPATIENT
Start: 2017-07-20 | End: 2017-07-22

## 2017-07-20 RX ORDER — GLYCOPYRROLATE 0.2 MG/ML
INJECTION INTRAMUSCULAR; INTRAVENOUS AS NEEDED
Status: DISCONTINUED | OUTPATIENT
Start: 2017-07-20 | End: 2017-07-20 | Stop reason: SURG

## 2017-07-20 RX ORDER — NITROGLYCERIN 20 MG/100ML
INJECTION INTRAVENOUS CONTINUOUS PRN
Status: DISCONTINUED | OUTPATIENT
Start: 2017-07-20 | End: 2017-07-22

## 2017-07-20 RX ORDER — MAGNESIUM SULFATE HEPTAHYDRATE 40 MG/ML
2 INJECTION, SOLUTION INTRAVENOUS AS NEEDED
Status: DISCONTINUED | OUTPATIENT
Start: 2017-07-20 | End: 2017-07-26

## 2017-07-20 RX ORDER — MIDAZOLAM HYDROCHLORIDE 1 MG/ML
INJECTION INTRAMUSCULAR; INTRAVENOUS AS NEEDED
Status: DISCONTINUED | OUTPATIENT
Start: 2017-07-20 | End: 2017-07-20 | Stop reason: SURG

## 2017-07-20 RX ORDER — MORPHINE SULFATE 4 MG/ML
8 INJECTION, SOLUTION INTRAMUSCULAR; INTRAVENOUS EVERY 2 HOUR PRN
Status: DISCONTINUED | OUTPATIENT
Start: 2017-07-20 | End: 2017-07-26

## 2017-07-20 RX ORDER — MILRINONE LACTATE 0.2 MG/ML
0.38 INJECTION, SOLUTION INTRAVENOUS AS NEEDED
Status: DISCONTINUED | OUTPATIENT
Start: 2017-07-20 | End: 2017-07-22

## 2017-07-20 RX ORDER — AMINOCAPROIC ACID 250 MG/ML
1 INJECTION, SOLUTION INTRAVENOUS ONCE
Status: DISCONTINUED | OUTPATIENT
Start: 2017-07-20 | End: 2017-07-20 | Stop reason: CLARIF

## 2017-07-20 RX ORDER — AMIODARONE HYDROCHLORIDE 200 MG/1
200 TABLET ORAL
Status: DISCONTINUED | OUTPATIENT
Start: 2017-07-20 | End: 2017-07-26

## 2017-07-20 RX ORDER — SODIUM CHLORIDE 0.9 % (FLUSH) 0.9 %
10 SYRINGE (ML) INJECTION AS NEEDED
Status: DISCONTINUED | OUTPATIENT
Start: 2017-07-20 | End: 2017-07-26

## 2017-07-20 RX ORDER — METOCLOPRAMIDE HYDROCHLORIDE 5 MG/ML
5 INJECTION INTRAMUSCULAR; INTRAVENOUS EVERY 6 HOURS
Status: DISCONTINUED | OUTPATIENT
Start: 2017-07-20 | End: 2017-07-24

## 2017-07-20 RX ORDER — HYDROCODONE BITARTRATE AND ACETAMINOPHEN 5; 325 MG/1; MG/1
2 TABLET ORAL EVERY 4 HOURS PRN
Status: DISCONTINUED | OUTPATIENT
Start: 2017-07-20 | End: 2017-07-26

## 2017-07-20 RX ORDER — BISACODYL 10 MG
10 SUPPOSITORY, RECTAL RECTAL
Status: DISCONTINUED | OUTPATIENT
Start: 2017-07-20 | End: 2017-07-26

## 2017-07-20 RX ORDER — HYDROCODONE BITARTRATE AND ACETAMINOPHEN 5; 325 MG/1; MG/1
1 TABLET ORAL EVERY 4 HOURS PRN
Status: DISCONTINUED | OUTPATIENT
Start: 2017-07-20 | End: 2017-07-26

## 2017-07-20 RX ORDER — POTASSIUM CHLORIDE 14.9 MG/ML
20 INJECTION INTRAVENOUS AS NEEDED
Status: DISCONTINUED | OUTPATIENT
Start: 2017-07-20 | End: 2017-07-26

## 2017-07-20 RX ORDER — DEXTROSE, SODIUM CHLORIDE, SODIUM LACTATE, POTASSIUM CHLORIDE, AND CALCIUM CHLORIDE 5; .6; .31; .03; .02 G/100ML; G/100ML; G/100ML; G/100ML; G/100ML
INJECTION, SOLUTION INTRAVENOUS CONTINUOUS
Status: DISCONTINUED | OUTPATIENT
Start: 2017-07-20 | End: 2017-07-22

## 2017-07-20 RX ORDER — FAMOTIDINE 20 MG/1
20 TABLET ORAL DAILY
Status: DISCONTINUED | OUTPATIENT
Start: 2017-07-21 | End: 2017-07-24

## 2017-07-20 RX ORDER — CLOPIDOGREL BISULFATE 75 MG/1
75 TABLET ORAL DAILY
Status: DISCONTINUED | OUTPATIENT
Start: 2017-07-20 | End: 2017-07-26

## 2017-07-20 RX ORDER — MAGNESIUM SULFATE 1 G/100ML
1 INJECTION INTRAVENOUS AS NEEDED
Status: DISCONTINUED | OUTPATIENT
Start: 2017-07-20 | End: 2017-07-26

## 2017-07-20 RX ORDER — SODIUM CHLORIDE 9 MG/ML
INJECTION, SOLUTION INTRAVENOUS CONTINUOUS
Status: DISCONTINUED | OUTPATIENT
Start: 2017-07-20 | End: 2017-07-22

## 2017-07-20 RX ORDER — ALBUMIN, HUMAN INJ 5% 5 %
SOLUTION INTRAVENOUS CONTINUOUS PRN
Status: DISCONTINUED | OUTPATIENT
Start: 2017-07-20 | End: 2017-07-20 | Stop reason: SURG

## 2017-07-20 RX ORDER — ACETAMINOPHEN 10 MG/ML
1000 INJECTION, SOLUTION INTRAVENOUS EVERY 8 HOURS
Status: COMPLETED | OUTPATIENT
Start: 2017-07-20 | End: 2017-07-21

## 2017-07-20 RX ORDER — SODIUM CHLORIDE, SODIUM LACTATE, POTASSIUM CHLORIDE, CALCIUM CHLORIDE 600; 310; 30; 20 MG/100ML; MG/100ML; MG/100ML; MG/100ML
INJECTION, SOLUTION INTRAVENOUS CONTINUOUS PRN
Status: DISCONTINUED | OUTPATIENT
Start: 2017-07-20 | End: 2017-07-20 | Stop reason: SURG

## 2017-07-20 RX ORDER — ACETAMINOPHEN 325 MG/1
650 TABLET ORAL EVERY 4 HOURS PRN
Status: DISCONTINUED | OUTPATIENT
Start: 2017-07-20 | End: 2017-07-26

## 2017-07-20 RX ORDER — GLYCOPYRROLATE 0.2 MG/ML
1 INJECTION, SOLUTION INTRAMUSCULAR; INTRAVENOUS ONCE AS NEEDED
Status: COMPLETED | OUTPATIENT
Start: 2017-07-20 | End: 2017-07-20

## 2017-07-20 RX ORDER — MORPHINE SULFATE 4 MG/ML
4 INJECTION, SOLUTION INTRAMUSCULAR; INTRAVENOUS EVERY 2 HOUR PRN
Status: DISCONTINUED | OUTPATIENT
Start: 2017-07-20 | End: 2017-07-26

## 2017-07-20 RX ORDER — CHLORHEXIDINE GLUCONATE 0.12 MG/ML
15 RINSE ORAL 2 TIMES DAILY
Status: DISCONTINUED | OUTPATIENT
Start: 2017-07-20 | End: 2017-07-26

## 2017-07-20 RX ORDER — FAMOTIDINE 20 MG/1
20 TABLET ORAL ONCE
Status: DISCONTINUED | OUTPATIENT
Start: 2017-07-20 | End: 2017-07-22 | Stop reason: HOSPADM

## 2017-07-20 RX ORDER — DOBUTAMINE HYDROCHLORIDE 100 MG/100ML
INJECTION INTRAVENOUS CONTINUOUS PRN
Status: DISCONTINUED | OUTPATIENT
Start: 2017-07-20 | End: 2017-07-20 | Stop reason: SURG

## 2017-07-20 RX ORDER — MORPHINE SULFATE 2 MG/ML
2 INJECTION, SOLUTION INTRAMUSCULAR; INTRAVENOUS EVERY 2 HOUR PRN
Status: DISCONTINUED | OUTPATIENT
Start: 2017-07-20 | End: 2017-07-26

## 2017-07-20 RX ORDER — NEOSTIGMINE METHYLSULFATE 0.5 MG/ML
5 INJECTION INTRAVENOUS ONCE AS NEEDED
Status: COMPLETED | OUTPATIENT
Start: 2017-07-20 | End: 2017-07-20

## 2017-07-20 RX ORDER — ALBUMIN, HUMAN INJ 5% 5 %
250 SOLUTION INTRAVENOUS ONCE AS NEEDED
Status: DISCONTINUED | OUTPATIENT
Start: 2017-07-20 | End: 2017-07-26

## 2017-07-20 RX ORDER — ROCURONIUM BROMIDE 10 MG/ML
INJECTION, SOLUTION INTRAVENOUS AS NEEDED
Status: DISCONTINUED | OUTPATIENT
Start: 2017-07-20 | End: 2017-07-20 | Stop reason: SURG

## 2017-07-20 RX ORDER — CEFAZOLIN SODIUM 1 G/3ML
INJECTION, POWDER, FOR SOLUTION INTRAMUSCULAR; INTRAVENOUS AS NEEDED
Status: DISCONTINUED | OUTPATIENT
Start: 2017-07-20 | End: 2017-07-22 | Stop reason: HOSPADM

## 2017-07-20 RX ORDER — ASPIRIN 81 MG/1
81 TABLET ORAL DAILY
Status: DISCONTINUED | OUTPATIENT
Start: 2017-07-20 | End: 2017-07-26

## 2017-07-20 RX ORDER — LORAZEPAM 1 MG/1
1 TABLET ORAL ONCE
Status: DISCONTINUED | OUTPATIENT
Start: 2017-07-20 | End: 2017-07-24

## 2017-07-20 RX ORDER — POTASSIUM CHLORIDE 29.8 MG/ML
40 INJECTION INTRAVENOUS AS NEEDED
Status: DISCONTINUED | OUTPATIENT
Start: 2017-07-20 | End: 2017-07-26

## 2017-07-20 RX ORDER — ONDANSETRON 2 MG/ML
4 INJECTION INTRAMUSCULAR; INTRAVENOUS EVERY 6 HOURS PRN
Status: DISCONTINUED | OUTPATIENT
Start: 2017-07-20 | End: 2017-07-26

## 2017-07-20 RX ORDER — HEPARIN SODIUM 1000 [USP'U]/ML
INJECTION, SOLUTION INTRAVENOUS; SUBCUTANEOUS AS NEEDED
Status: DISCONTINUED | OUTPATIENT
Start: 2017-07-20 | End: 2017-07-22 | Stop reason: HOSPADM

## 2017-07-20 RX ORDER — SODIUM CHLORIDE 9 MG/ML
83 INJECTION, SOLUTION INTRAVENOUS CONTINUOUS
Status: DISCONTINUED | OUTPATIENT
Start: 2017-07-21 | End: 2017-07-22

## 2017-07-20 RX ORDER — ENOXAPARIN SODIUM 100 MG/ML
40 INJECTION SUBCUTANEOUS DAILY
Status: DISCONTINUED | OUTPATIENT
Start: 2017-07-21 | End: 2017-07-26

## 2017-07-20 RX ORDER — MORPHINE SULFATE 2 MG/ML
2 INJECTION, SOLUTION INTRAMUSCULAR; INTRAVENOUS ONCE
Status: DISCONTINUED | OUTPATIENT
Start: 2017-07-20 | End: 2017-07-24

## 2017-07-20 RX ORDER — DOXEPIN HYDROCHLORIDE 50 MG/1
1 CAPSULE ORAL DAILY
Status: DISCONTINUED | OUTPATIENT
Start: 2017-07-21 | End: 2017-07-26

## 2017-07-20 RX ORDER — TEMAZEPAM 15 MG/1
15 CAPSULE ORAL NIGHTLY PRN
Status: DISCONTINUED | OUTPATIENT
Start: 2017-07-20 | End: 2017-07-26

## 2017-07-20 RX ORDER — METOCLOPRAMIDE 10 MG/1
10 TABLET ORAL ONCE
Status: DISCONTINUED | OUTPATIENT
Start: 2017-07-20 | End: 2017-07-22 | Stop reason: HOSPADM

## 2017-07-20 RX ORDER — MAGNESIUM OXIDE 400 MG (241.3 MG MAGNESIUM) TABLET
400 TABLET ONCE
Status: COMPLETED | OUTPATIENT
Start: 2017-07-20 | End: 2017-07-20

## 2017-07-20 RX ORDER — FAMOTIDINE 10 MG/ML
20 INJECTION, SOLUTION INTRAVENOUS DAILY
Status: DISCONTINUED | OUTPATIENT
Start: 2017-07-21 | End: 2017-07-24

## 2017-07-20 RX ADMIN — MIDAZOLAM HYDROCHLORIDE 1 MG: 1 INJECTION INTRAMUSCULAR; INTRAVENOUS at 10:59:00

## 2017-07-20 RX ADMIN — ROCURONIUM BROMIDE 100 MG: 10 INJECTION, SOLUTION INTRAVENOUS at 11:36:00

## 2017-07-20 RX ADMIN — ALBUMIN, HUMAN INJ 5%: 5 SOLUTION INTRAVENOUS at 13:31:00

## 2017-07-20 RX ADMIN — PHENYLEPHRINE HCL 100 MCG: 10 MG/ML VIAL (ML) INJECTION at 13:31:00

## 2017-07-20 RX ADMIN — PROTAMINE SULFATE 50 MG: 10 INJECTION, SOLUTION INTRAVENOUS at 16:03:00

## 2017-07-20 RX ADMIN — DOBUTAMINE HYDROCHLORIDE 2.5 MCG/KG/MIN: 100 INJECTION INTRAVENOUS at 14:50:00

## 2017-07-20 RX ADMIN — ALBUMIN, HUMAN INJ 5%: 5 SOLUTION INTRAVENOUS at 16:01:00

## 2017-07-20 RX ADMIN — ROCURONIUM BROMIDE 50 MG: 10 INJECTION, SOLUTION INTRAVENOUS at 14:40:00

## 2017-07-20 RX ADMIN — MIDAZOLAM HYDROCHLORIDE 1 MG: 1 INJECTION INTRAMUSCULAR; INTRAVENOUS at 14:40:00

## 2017-07-20 RX ADMIN — SODIUM CHLORIDE, SODIUM LACTATE, POTASSIUM CHLORIDE, CALCIUM CHLORIDE: 600; 310; 30; 20 INJECTION, SOLUTION INTRAVENOUS at 10:58:00

## 2017-07-20 RX ADMIN — ALBUMIN, HUMAN INJ 5%: 5 SOLUTION INTRAVENOUS at 16:00:00

## 2017-07-20 RX ADMIN — MIDAZOLAM HYDROCHLORIDE 1 MG: 1 INJECTION INTRAMUSCULAR; INTRAVENOUS at 11:08:00

## 2017-07-20 RX ADMIN — SODIUM CHLORIDE, SODIUM LACTATE, POTASSIUM CHLORIDE, CALCIUM CHLORIDE: 600; 310; 30; 20 INJECTION, SOLUTION INTRAVENOUS at 16:15:00

## 2017-07-20 RX ADMIN — ALBUMIN, HUMAN INJ 5%: 5 SOLUTION INTRAVENOUS at 13:30:00

## 2017-07-20 RX ADMIN — DOBUTAMINE HYDROCHLORIDE 1.2 MCG/KG/MIN: 100 INJECTION INTRAVENOUS at 15:24:00

## 2017-07-20 RX ADMIN — PROTAMINE SULFATE 450 MG: 10 INJECTION, SOLUTION INTRAVENOUS at 15:24:00

## 2017-07-20 RX ADMIN — PHENYLEPHRINE HCL 100 MCG: 10 MG/ML VIAL (ML) INJECTION at 13:35:00

## 2017-07-20 RX ADMIN — MIDAZOLAM HYDROCHLORIDE 1 MG: 1 INJECTION INTRAMUSCULAR; INTRAVENOUS at 11:33:00

## 2017-07-20 RX ADMIN — SODIUM CHLORIDE, SODIUM LACTATE, POTASSIUM CHLORIDE, CALCIUM CHLORIDE: 600; 310; 30; 20 INJECTION, SOLUTION INTRAVENOUS at 12:30:00

## 2017-07-20 RX ADMIN — GLYCOPYRROLATE 0.2 MG: 0.2 INJECTION INTRAMUSCULAR; INTRAVENOUS at 12:15:00

## 2017-07-20 NOTE — OPERATIVE REPORT
[facility] PRE OP RECOVERY  Operative Note     Josesitonikki Casper Location: OR   [facility] [id number] MRN [id number]   Admission Date 7/16/2017 Operation Date 7/20/2017   Attending Physician Vinnie Ledesma DO Operating Physician Mick Bustamante MD      Preoper cardiopulmonary bypass and cooled to 33 degrees. The aorta was crossclamped, antegrade and retrograde cardioplegia were administered. After the initial dose additional doses were given after each anastomosis.   The first bypass was performed to the right of packed red blood cells for hemodilution no anemia on pump in patient had preop anemia.        Cindi Moore MD  7/20/2017  4:42 PM

## 2017-07-20 NOTE — ANESTHESIA PROCEDURE NOTES
Central Line  Performed by: Bella Souza  Authorized by: Bella Souza     Procedure Start:  7/20/2017 12:00 PM  Procedure End:  7/20/2017 12:15 PM  Site Identification: real time ultrasound guided, surface landmarks and image stored and retrievab

## 2017-07-20 NOTE — CONSULTS
Critical Care H&P/Consult     NAME: Domi Arevalo - [facility] - MRN: [id number] - Age: 61year old - :  1960    Date of Admission: 2017  1:32 PM  Admission Diagnosis: Ischemic cardiomyopathy [I25.5]  Acute chest pain [R07.9]    Ass ; first pregnancy  Social History:   Smoking status: Former Smoker  1.00 Packs/day  For 14.00 Years     Types: Cigarettes    Quit date: 1/21/1999    Smokeless tobacco: Never Used    Alcohol use No     Family History:  indicated that the status of her other NaCl infusion  Intravenous Continuous   temazepam (RESTORIL) cap 15 mg 15 mg Oral Nightly PRN   Albumin Human (ALBUMINAR) 5 % solution 250 mL 250 mL Intravenous Once PRN   DOBUTamine in D5W (DOBUTREX) 250 mg/250 ml infusion 2.5-10 mcg/kg/min (Dosing Weight HYDROcodone-acetaminophen (NORCO) 5-325 MG per tab 1 tablet 1 tablet Oral Q4H PRN   Or      HYDROcodone-acetaminophen (NORCO) 5-325 MG per tab 2 tablet 2 tablet Oral Q4H PRN   morphINE sulfate (PF) 2 MG/ML injection 2 mg 2 mg Intravenous Q2H PRN   Or metoprolol Tartrate (LOPRESSOR) tab 25 mg 25 mg Oral Daily   [COMPLETED] CeFAZolin Sodium (ANCEF/KEFZOL) IVPB premix 2 g 2 g Intravenous On Call   [COMPLETED] Vitamin C (VITAMIN C) tab 1,000 mg 1,000 mg Oral Once   [COMPLETED] sodium bicarbonate injection [MAR Hold] Levothyroxine Sodium (SYNTHROID) tab 50 mcg 50 mcg Oral Before breakfast   [MAR Hold] Metoprolol Succinate ER (Toprol XL) 24 hr tab 25 mg 25 mg Oral Nightly     • [START ON 7/21/2017] sodium chloride     • norepinephrine 0.02 mcg/kg/min (07/20 sounds, distant   Chest wall: midline incision and chest tubes noted   Heart: distant, regular rhythm   Abdomen: soft, non-tender, non-distended, positive BS. Extremity: R foot amputation and multiple finger amputations noted.  + edema, dressing not taken

## 2017-07-20 NOTE — OR NURSING
Called Moo Becker (sister) to inform her of start of surgery. I informed her that either I or Dr Tito Moreno would call her or that he would meet her in the CCU family waiting area once finishing with surgery.

## 2017-07-20 NOTE — ADDENDUM NOTE
Addendum  created 07/20/17 1737 by Cande Ybarra MD    Anesthesia Intra Blocks edited, LDA updated via procedure documentation, Sign clinical note

## 2017-07-20 NOTE — PROGRESS NOTES
Post op note:    Arrived from OR at 46, aet 1716  Ventilator settings per respiratory, sedated  Drips: amicar,levo,dobutamine,insulin,bicarb, precedex  Chest tube intact, no air leak noted; tpw secured rate at 55;not currently pacing  Palisade at 55cm;secure

## 2017-07-20 NOTE — ANESTHESIA POSTPROCEDURE EVALUATION
Patient: Thuan New    Procedure Summary     Date:  07/20/17 Room / Location:  [facility] OR  / [facility] OR    Anesthesia Start:  0978 Anesthesia Stop:  0933    Procedure:  HEART CORONARY ARTERY BYPASS GRAFT (N/A ) Diagnosis:  (coronary artery disease)

## 2017-07-20 NOTE — ADDENDUM NOTE
Addendum  created 07/20/17 1755 by Sheng Alvarenga MD    Anesthesia Intra Blocks edited, LDA updated via procedure documentation, Sign clinical note

## 2017-07-20 NOTE — PROGRESS NOTES
[facility] Hospitalist Progress Note     CC: Hospital Follow up    PCP: Suraj Reid MD       Assessment/Plan:   Ms. Inocencio Dubois is a 61year old female with PMH sig for CAD s/p NSTEMI 1/2017, CHF (EF 35%) HLD, HTN, DM with neuropathy, hs of congential hand and lady post-op     HLD  -statin     FN:  - IVF: per protocol  - Diet: advance per surgery     DVT Prophy: lovenox  Lines: PIV     Dispo: pending CABG recovery     Further recommendations pending patient's clinical course.   [facility] hospitalist to continue to follow pa edema has resolved, mild soft tissue swelling on dorsal aspect L foot with mild asoc TTP      Data Review:       Labs:     Recent Labs   Lab  07/18/17   0317  07/19/17   0647  07/20/17   0646   RBC  3.53*  3.36*  3.31*   HGB  11.2*  10.9*  10.8*   HCT  33. [MAR Hold] Levothyroxine Sodium  50 mcg Oral Before breakfast   • [MAR Hold] Metoprolol Succinate ER  25 mg Oral Nightly     • norepinephrine 0.02 mcg/kg/min (07/20/17 1530)   • dexmedetomidine (PRECEDEX) IV infusion 0.5 mcg/kg/hr (07/20/17 1240)   • insul

## 2017-07-20 NOTE — ANESTHESIA PROCEDURE NOTES
Procedure Performed: DEWAYNE       Start Time:  7/20/2017 11:45 AM       End Time:   7/20/2017 5:00 PM    Preanesthesia Checklist:  Patient identified, IV assessed, risks and benefits discussed, monitors and equipment assessed, procedure being performed at [facility]

## 2017-07-20 NOTE — PROGRESS NOTES
Count includes the [facility] Pharmacy Note:  Renal Dose Adjustment for Metoclopramide (REGLAN)    Rachel Yarbrough has been prescribed Metoclopramide (REGLAN) 10 mg every 6 hours as needed for nausea and vomiting.     Estimated Creatinine Clearance: 33.8 mL/min (based on SCr of 1.5 mg

## 2017-07-20 NOTE — ANESTHESIA PROCEDURE NOTES
Arterial Line  Performed by: Karolina Hopkins by: Key Atkinson     Procedure Start:  7/20/2017 11:05 AM  Procedure End:  7/20/2017 11:10 AM  Site Identification: real time ultrasound guided and surface landmarks    Patient Location:  OR

## 2017-07-20 NOTE — PROGRESS NOTES
[facility] Pharmacy Note:  Renal Dose Adjustment    Pia Alvarez has been prescribed famotidine (PEPCID) 20 mg intravenously and orally every 12 hours. Estimated Creatinine Clearance: 33.8 mL/min (based on SCr of 1.5 mg/dL).     Her calculated creatinine clear

## 2017-07-21 ENCOUNTER — APPOINTMENT (OUTPATIENT)
Dept: GENERAL RADIOLOGY | Facility: HOSPITAL | Age: 57
DRG: 234 | End: 2017-07-21
Attending: NURSE PRACTITIONER
Payer: MEDICAID

## 2017-07-21 LAB
ANION GAP SERPL CALC-SCNC: 10 MMOL/L (ref 0–18)
BASOPHILS # BLD: 0 K/UL (ref 0–0.2)
BASOPHILS NFR BLD: 0 %
BUN SERPL-MCNC: 27 MG/DL (ref 8–20)
BUN/CREAT SERPL: 16.7 (ref 10–20)
CALCIUM SERPL-MCNC: 7.8 MG/DL (ref 8.5–10.5)
CHLORIDE SERPL-SCNC: 105 MMOL/L (ref 95–110)
CO2 SERPL-SCNC: 23 MMOL/L (ref 22–32)
CREAT SERPL-MCNC: 1.62 MG/DL (ref 0.5–1.5)
EOSINOPHIL # BLD: 0 K/UL (ref 0–0.7)
EOSINOPHIL NFR BLD: 0 %
ERYTHROCYTE [DISTWIDTH] IN BLOOD BY AUTOMATED COUNT: 14.8 % (ref 11–15)
GLUCOSE BLDC GLUCOMTR-MCNC: 116 MG/DL (ref 70–99)
GLUCOSE BLDC GLUCOMTR-MCNC: 125 MG/DL (ref 70–99)
GLUCOSE BLDC GLUCOMTR-MCNC: 127 MG/DL (ref 70–99)
GLUCOSE BLDC GLUCOMTR-MCNC: 131 MG/DL (ref 70–99)
GLUCOSE BLDC GLUCOMTR-MCNC: 132 MG/DL (ref 70–99)
GLUCOSE BLDC GLUCOMTR-MCNC: 133 MG/DL (ref 70–99)
GLUCOSE BLDC GLUCOMTR-MCNC: 134 MG/DL (ref 70–99)
GLUCOSE BLDC GLUCOMTR-MCNC: 136 MG/DL (ref 70–99)
GLUCOSE BLDC GLUCOMTR-MCNC: 138 MG/DL (ref 70–99)
GLUCOSE BLDC GLUCOMTR-MCNC: 139 MG/DL (ref 70–99)
GLUCOSE BLDC GLUCOMTR-MCNC: 139 MG/DL (ref 70–99)
GLUCOSE BLDC GLUCOMTR-MCNC: 141 MG/DL (ref 70–99)
GLUCOSE BLDC GLUCOMTR-MCNC: 144 MG/DL (ref 70–99)
GLUCOSE BLDC GLUCOMTR-MCNC: 147 MG/DL (ref 70–99)
GLUCOSE BLDC GLUCOMTR-MCNC: 151 MG/DL (ref 70–99)
GLUCOSE BLDC GLUCOMTR-MCNC: 156 MG/DL (ref 70–99)
GLUCOSE BLDC GLUCOMTR-MCNC: 162 MG/DL (ref 70–99)
GLUCOSE BLDC GLUCOMTR-MCNC: 165 MG/DL (ref 70–99)
GLUCOSE BLDC GLUCOMTR-MCNC: 168 MG/DL (ref 70–99)
GLUCOSE BLDC GLUCOMTR-MCNC: 180 MG/DL (ref 70–99)
GLUCOSE BLDC GLUCOMTR-MCNC: 189 MG/DL (ref 70–99)
GLUCOSE BLDC GLUCOMTR-MCNC: 99 MG/DL (ref 70–99)
GLUCOSE SERPL-MCNC: 149 MG/DL (ref 70–99)
HCT VFR BLD AUTO: 31.2 % (ref 35–48)
HGB BLD-MCNC: 10.5 G/DL (ref 12–16)
LYMPHOCYTES # BLD: 0.5 K/UL (ref 1–4)
LYMPHOCYTES NFR BLD: 4 %
MAGNESIUM SERPL-MCNC: 2.2 MG/DL (ref 1.8–2.5)
MCH RBC QN AUTO: 31.3 PG (ref 27–32)
MCHC RBC AUTO-ENTMCNC: 33.8 G/DL (ref 32–37)
MCV RBC AUTO: 92.6 FL (ref 80–100)
MONOCYTES # BLD: 0.6 K/UL (ref 0–1)
MONOCYTES NFR BLD: 5 %
NEUTROPHILS # BLD AUTO: 11 K/UL (ref 1.8–7.7)
NEUTROPHILS NFR BLD: 91 %
OSMOLALITY UR CALC.SUM OF ELEC: 294 MOSM/KG (ref 275–295)
PLATELET # BLD AUTO: 152 K/UL (ref 140–400)
PMV BLD AUTO: 8.6 FL (ref 7.4–10.3)
POTASSIUM SERPL-SCNC: 4.2 MMOL/L (ref 3.3–5.1)
RBC # BLD AUTO: 3.36 M/UL (ref 3.7–5.4)
SODIUM SERPL-SCNC: 138 MMOL/L (ref 136–144)
WBC # BLD AUTO: 12.1 K/UL (ref 4–11)

## 2017-07-21 PROCEDURE — 94668 MNPJ CHEST WALL SBSQ: CPT

## 2017-07-21 PROCEDURE — 71010 XR CHEST AP PORTABLE  (CPT=71010): CPT | Performed by: NURSE PRACTITIONER

## 2017-07-21 PROCEDURE — 36592 COLLECT BLOOD FROM PICC: CPT

## 2017-07-21 PROCEDURE — S0028 INJECTION, FAMOTIDINE, 20 MG: HCPCS | Performed by: NURSE PRACTITIONER

## 2017-07-21 PROCEDURE — 82962 GLUCOSE BLOOD TEST: CPT

## 2017-07-21 PROCEDURE — 85025 COMPLETE CBC W/AUTO DIFF WBC: CPT | Performed by: NURSE PRACTITIONER

## 2017-07-21 PROCEDURE — 80048 BASIC METABOLIC PNL TOTAL CA: CPT | Performed by: THORACIC SURGERY (CARDIOTHORACIC VASCULAR SURGERY)

## 2017-07-21 PROCEDURE — 93010 ELECTROCARDIOGRAM REPORT: CPT | Performed by: NURSE PRACTITIONER

## 2017-07-21 PROCEDURE — 83735 ASSAY OF MAGNESIUM: CPT | Performed by: HOSPITALIST

## 2017-07-21 PROCEDURE — 93005 ELECTROCARDIOGRAM TRACING: CPT

## 2017-07-21 NOTE — PROGRESS NOTES
[facility] Hospitalist Progress Note     CC: Hospital Follow up    PCP: Pascual Mayfield MD       Assessment/Plan:   Ms. Radha Schaffer is a 61year old female with PMH sig for CAD s/p NSTEMI 1/2017, CHF (EF 35%) HLD, HTN, DM with neuropathy, hs of congential hand and lady to continue to follow patient while in house     Patient and/or patient's family given opportunity to ask questions and note understanding and agreeing with therapeutic plan as outlined     Geneva Sam MD  [facility] Hospitalist  Answering Service number: [phone] 32.6*  30.9  31.3   MCHC  34.6  33.3  33.8   RDW  13.6  14.2  14.8   WBC  8.9  14.2*  12.1*   PLT  211  103*  152         Recent Labs   Lab  07/20/17   0646  07/20/17   1716  07/21/17   0420   GLU  170*  148*  149*   BUN  34*  28*  27*   CREATSERUM  1.50 Chlorhexidine Gluconate  15 mL Mouth/Throat BID   • enoxaparin  40 mg Subcutaneous Daily   • insulin (NOVOLIN R) bolus from bag  0.1 Units/kg Intravenous Once   • aminocaproic acid infusion  1 g/hr Intravenous Once   • Amiodarone HCl  200 mg Oral TID CC Saline Flush, [MAR Hold] dextrose 50%, [MAR Hold] Glucose-Vitamin C

## 2017-07-21 NOTE — DISCHARGE PLANNING
LISA following up on d/c planing for the patient. Orders received for [facility]  on d/c. [facility] does not accept the patient's insurance and a referral was placed to [facility].       Daksha Bella [facility] Hospital[facility]RAY[facility]  [id number]

## 2017-07-21 NOTE — PLAN OF CARE
Problem: Diabetes/Glucose Control  Goal: Glucose maintained within prescribed range  INTERVENTIONS:  - Monitor Blood Glucose as ordered  - Assess for signs and symptoms of hyperglycemia and hypoglycemia  - Administer ordered medications to maintain glucose sites and surrounding tissue  - Implement wound care per orders  - Initiate isolation precautions as appropriate  - Initiate Pressure Ulcer prevention bundle as indicated   Outcome: Progressing  Midsternal, left leg incisions with Prevena wound vacs remain effects  - Notify MD/LIP if interventions unsuccessful or patient reports new pain   Outcome: Progressing  Verbalizes full relief from Morphine 4mg IVP.      Problem: RESPIRATORY - ADULT  Goal: Achieves optimal ventilation and oxygenation  INTERVENTIONS:  - restrictions as appropriate   Outcome: Progressing  K+ & Mg Levels wnl.  No replacement given this am.   Goal: Hemodynamic stability and optimal renal function maintained  INTERVENTIONS:  - Monitor labs and assess for signs and symptoms of volume excess or

## 2017-07-21 NOTE — PROGRESS NOTES
ASSESSMENT/PLAN:     impression:   1. Acute coronary syndrome with multi vessel cad on cath and lv dysfunction         S/P CABG X 3 7/20/ 2017  2. Pulmonary hypertension by cath in January 2017.   Echo in June 2017 pulm htn resolved  and ef improved  3 1/21/1999  Smokeless tobacco: Never Used                      Alcohol use: No                 REVIEW OF SYSTEMS:   CONS:denies fever, chills, significant weight change. CV:see HPI. RESP:denies chronic cough, hemoptysis. GI:denies melena, hematochezia.  :d

## 2017-07-21 NOTE — PROGRESS NOTES
Wilma Olivo Patient Status:  Inpatient    1960 MRN [id number]   Location [facility] 2W/SW Attending Gabe Cifuentes MD   [facility] Day [facility] PCP Meng Weiner MD     Critical Care Progress Note    Assessment/Plan:  1.  NSTEMI and CHF s/p CABG  - w SIMV/VC  FiO2 (%):  [40 %-100 %] 40 %  S RR:  [12] 12  S VT:  [500 mL] 500 mL  PEEP/CPAP (cm H2O):  [5 cm H20] 5 cm H20  NH SUP:  [1 cm H20] 1 cm H20  MAP (cm H2O):  [8] 8    Intake/Output Summary (Last 24 hours) at 07/21/17 0803  Last data filed at 07/21/

## 2017-07-21 NOTE — PLAN OF CARE
Problem: Diabetes/Glucose Control  Goal: Glucose maintained within prescribed range  INTERVENTIONS:  - Monitor Blood Glucose as ordered  - Assess for signs and symptoms of hyperglycemia and hypoglycemia  - Administer ordered medications to maintain glucose administer replacement therapy as ordered   Outcome: Progressing  On amiodarone per protocol for a fib prevention    Problem: SKIN/TISSUE INTEGRITY - ADULT  Goal: Incision(s), wounds(s) or drain site(s) healing without S/S of infection  INTERVENTIONS:  - A patient/family in tolerated activity level and precautions   Outcome: Progressing  Pt/ot eval ordered    Problem: PAIN - ADULT  Goal: Verbalizes/displays adequate comfort level or patient's stated pain goal  INTERVENTIONS:  - Encourage pt to monitor pain a toileting routine/schedule  - Consider collaborating with pharmacy to review patient's medication profile   Outcome: Progressing      Problem: METABOLIC/FLUID AND ELECTROLYTES - ADULT  Goal: Electrolytes maintained within normal limits  INTERVENTIONS:  - M

## 2017-07-21 NOTE — PROGRESS NOTES
ASSESSMENT/PLAN:     impression:   1. Acute coronary syndrome with multi vessel cad on cath and lv dysfunction         S/P CABG X 3 7/20/ 2017  2. Pulmonary hypertension by cath in January 2017.   Echo in June 2017 pulm htn resolved  and ef improved  3 Alcohol use: No                 REVIEW OF SYSTEMS:   CONS:denies fever, chills, significant weight change. CV:see HPI. RESP:denies chronic cough, hemoptysis. GI:denies melena, hematochezia. :denies hematuria. INTEG:no new rashes.  MS:no limiting arthrit

## 2017-07-22 LAB
ANION GAP SERPL CALC-SCNC: 7 MMOL/L (ref 0–18)
BASOPHILS # BLD: 0.1 K/UL (ref 0–0.2)
BASOPHILS NFR BLD: 1 %
BUN SERPL-MCNC: 30 MG/DL (ref 8–20)
BUN/CREAT SERPL: 18.9 (ref 10–20)
CALCIUM SERPL-MCNC: 8.2 MG/DL (ref 8.5–10.5)
CHLORIDE SERPL-SCNC: 103 MMOL/L (ref 95–110)
CO2 SERPL-SCNC: 28 MMOL/L (ref 22–32)
CREAT SERPL-MCNC: 1.59 MG/DL (ref 0.5–1.5)
EOSINOPHIL # BLD: 0 K/UL (ref 0–0.7)
EOSINOPHIL NFR BLD: 0 %
ERYTHROCYTE [DISTWIDTH] IN BLOOD BY AUTOMATED COUNT: 14.7 % (ref 11–15)
GLUCOSE BLDC GLUCOMTR-MCNC: 104 MG/DL (ref 70–99)
GLUCOSE BLDC GLUCOMTR-MCNC: 107 MG/DL (ref 70–99)
GLUCOSE BLDC GLUCOMTR-MCNC: 111 MG/DL (ref 70–99)
GLUCOSE BLDC GLUCOMTR-MCNC: 114 MG/DL (ref 70–99)
GLUCOSE BLDC GLUCOMTR-MCNC: 130 MG/DL (ref 70–99)
GLUCOSE BLDC GLUCOMTR-MCNC: 131 MG/DL (ref 70–99)
GLUCOSE BLDC GLUCOMTR-MCNC: 131 MG/DL (ref 70–99)
GLUCOSE BLDC GLUCOMTR-MCNC: 134 MG/DL (ref 70–99)
GLUCOSE BLDC GLUCOMTR-MCNC: 135 MG/DL (ref 70–99)
GLUCOSE BLDC GLUCOMTR-MCNC: 137 MG/DL (ref 70–99)
GLUCOSE BLDC GLUCOMTR-MCNC: 139 MG/DL (ref 70–99)
GLUCOSE BLDC GLUCOMTR-MCNC: 141 MG/DL (ref 70–99)
GLUCOSE BLDC GLUCOMTR-MCNC: 152 MG/DL (ref 70–99)
GLUCOSE BLDC GLUCOMTR-MCNC: 156 MG/DL (ref 70–99)
GLUCOSE BLDC GLUCOMTR-MCNC: 159 MG/DL (ref 70–99)
GLUCOSE BLDC GLUCOMTR-MCNC: 159 MG/DL (ref 70–99)
GLUCOSE SERPL-MCNC: 161 MG/DL (ref 70–99)
HCT VFR BLD AUTO: 28 % (ref 35–48)
HGB BLD-MCNC: 9.3 G/DL (ref 12–16)
LYMPHOCYTES # BLD: 0.9 K/UL (ref 1–4)
LYMPHOCYTES NFR BLD: 7 %
MAGNESIUM SERPL-MCNC: 2.2 MG/DL (ref 1.8–2.5)
MCH RBC QN AUTO: 30.9 PG (ref 27–32)
MCHC RBC AUTO-ENTMCNC: 33.1 G/DL (ref 32–37)
MCV RBC AUTO: 93.4 FL (ref 80–100)
MONOCYTES # BLD: 1.2 K/UL (ref 0–1)
MONOCYTES NFR BLD: 9 %
NEUTROPHILS # BLD AUTO: 11.1 K/UL (ref 1.8–7.7)
NEUTROPHILS NFR BLD: 83 %
OSMOLALITY UR CALC.SUM OF ELEC: 296 MOSM/KG (ref 275–295)
PLATELET # BLD AUTO: 165 K/UL (ref 140–400)
PMV BLD AUTO: 8.8 FL (ref 7.4–10.3)
POTASSIUM SERPL-SCNC: 4.1 MMOL/L (ref 3.3–5.1)
RBC # BLD AUTO: 3 M/UL (ref 3.7–5.4)
SODIUM SERPL-SCNC: 138 MMOL/L (ref 136–144)
WBC # BLD AUTO: 13.3 K/UL (ref 4–11)

## 2017-07-22 PROCEDURE — 76937 US GUIDE VASCULAR ACCESS: CPT

## 2017-07-22 PROCEDURE — 80048 BASIC METABOLIC PNL TOTAL CA: CPT | Performed by: HOSPITALIST

## 2017-07-22 PROCEDURE — 82962 GLUCOSE BLOOD TEST: CPT

## 2017-07-22 PROCEDURE — 85025 COMPLETE CBC W/AUTO DIFF WBC: CPT | Performed by: HOSPITALIST

## 2017-07-22 PROCEDURE — 97166 OT EVAL MOD COMPLEX 45 MIN: CPT

## 2017-07-22 PROCEDURE — 02HV33Z INSERTION OF INFUSION DEVICE INTO SUPERIOR VENA CAVA, PERCUTANEOUS APPROACH: ICD-10-PCS | Performed by: THORACIC SURGERY (CARDIOTHORACIC VASCULAR SURGERY)

## 2017-07-22 PROCEDURE — 97162 PT EVAL MOD COMPLEX 30 MIN: CPT

## 2017-07-22 PROCEDURE — 36569 INSJ PICC 5 YR+ W/O IMAGING: CPT

## 2017-07-22 PROCEDURE — 83735 ASSAY OF MAGNESIUM: CPT | Performed by: HOSPITALIST

## 2017-07-22 PROCEDURE — 94668 MNPJ CHEST WALL SBSQ: CPT

## 2017-07-22 RX ORDER — LEVOTHYROXINE SODIUM 0.05 MG/1
50 TABLET ORAL
Status: DISCONTINUED | OUTPATIENT
Start: 2017-07-22 | End: 2017-07-26

## 2017-07-22 RX ORDER — MELATONIN
325
Status: DISCONTINUED | OUTPATIENT
Start: 2017-07-22 | End: 2017-07-26

## 2017-07-22 RX ORDER — SODIUM CHLORIDE 0.9 % (FLUSH) 0.9 %
10 SYRINGE (ML) INJECTION AS NEEDED
Status: DISCONTINUED | OUTPATIENT
Start: 2017-07-22 | End: 2017-07-26

## 2017-07-22 RX ORDER — LIDOCAINE HYDROCHLORIDE 10 MG/ML
0.5 INJECTION, SOLUTION INFILTRATION; PERINEURAL ONCE AS NEEDED
Status: ACTIVE | OUTPATIENT
Start: 2017-07-22 | End: 2017-07-22

## 2017-07-22 NOTE — PROGRESS NOTES
assessment and plan:   Seen at bedside with Dr Horacio Beverly    S/P CABG x 3 with LIMA - LAD, SVG - RCA, SVG - OM.  POD #2\    oob and ambulating in halls this morning  Pain medication as tolerated  IS and Acapella use 8-10 times an hour  DVT prophylaxis - SCDs and

## 2017-07-22 NOTE — PLAN OF CARE
SAFETY ADULT - FALL    • Free from fall injury Progressing        Call light in reach, pt does not try to get oob by self, stand by asist when up, bed alarm on

## 2017-07-22 NOTE — OCCUPATIONAL THERAPY NOTE
OCCUPATIONAL THERAPY EVALUATION - INPATIENT     Room Number: 911/402-W  Evaluation Date: 7/22/2017  Type of Evaluation: Initial  Presenting Problem: CABG x3    Physician Order: IP Consult to Occupational Therapy  Reason for Therapy: ADL/IADL Dysfunction an Acute kidney injury ([facility] [address]    Azotemia      Past Medical History  Past Medical History:   Diagnosis Date   • Chronic systolic CHF (congestive heart failure) ([facility] [address] 2/27/2017   • Clubfoot, congenital     right   • Congenital hand deformity    • Congestive hea activity 85  Blood Pressure: 126/68    COGNITION  Following Commands:  follows multistep commands without difficulty        SENSATION  chronic numbness in right foot -> right ankle.      Communication: WFL    Behavioral/Emotional/Social: pleasant and motiva session/findings; All patient questions and concerns addressed      OT Goals     Patient will complete functional transfer with SBA  Comment:     Patient will complete toileting with SBA  Comment:     Patient will tolerate standing for 3  minutes in prep fo

## 2017-07-22 NOTE — PROGRESS NOTES
[facility] PULMONARY/CRITICAL CARE    S: Pt is doing ok, denies sob. Off o2 now, chest tubes removed.      Meds:  • ceFAZolin  2 g Intravenous Q12H   • ferrous sulfate  325 mg Oral TID CC   • insulin detemir  65 Units Subcutaneous Daily   • Insulin Aspart Pen  4 U air)  FiO2 (%): 40 %  O2 Flow Rate (L/min): 1 L/min  Pulse Oximetry Type: Continuous    FiO2 (%): 40 %      I/O last 3 completed shifts: In: 8370 [P.O.:375; I.V.:4697]  Out: 2010 [Urine:1375; Chest Tube:635]  No intake/output data recorded.     Gen - Alert HELENA Saldana

## 2017-07-22 NOTE — PROGRESS NOTES
[facility] Hospitalist Progress Note     CC: Hospital Follow up    PCP: Carrie Reddy MD       Assessment/Plan:   Ms. Donna Gooden is a 61year old female with PMH sig for CAD s/p NSTEMI 1/2017, CHF (EF 35%) HLD, HTN, DM with neuropathy, hs of congential hand and lady clinical course.   [facility] hospitalist to continue to follow patient while in house     Patient and/or patient's family given opportunity to ask questions and note understanding and agreeing with therapeutic plan as outlined     Natasha Sanders MD  [facility] Hospitalist  An 3.36*  3.00*   HGB  11.1*  10.5*  9.3*   HCT  33.3*  31.2*  28.0*   MCV  93.0  92.6  93.4   MCH  30.9  31.3  30.9   MCHC  33.3  33.8  33.1   RDW  14.2  14.8  14.7   WBC  14.2*  12.1*  13.3*   PLT  103*  152  165         Recent Labs   Lab  07/20/17   5505 Once   • Amiodarone HCl  200 mg Oral TID CC   • [MAR Hold] atorvastatin  40 mg Oral Nightly   • [MAR Hold] furosemide  20 mg Oral Nightly        Normal Saline Flush, Lidocaine HCl, Normal Saline Flush, temazepam, Albumin Human, magnesium hydroxide, bisacod

## 2017-07-22 NOTE — PHYSICAL THERAPY NOTE
PHYSICAL THERAPY EVALUATION - INPATIENT     Room Number: 003/129-T  Evaluation Date: 7/22/2017  Type of Evaluation: Initial          Reason for Therapy: Mobility Dysfunction and Discharge Planning    PHYSICAL THERAPY ASSESSMENT     Patient is a 61 year disease        Past Surgical History  Past Surgical History:  No date: AMPUTATION FOOT,TRANSMETATARSAL  No date:       Comment: premie at 30 week; poor weight gain  No date: CARPAL TUNNEL RELEASE  No date:       Comment: x1 ; first pregnancy Score: 17   PT Approx Degree of Impairment Score: 50.57%   Standardized Score (AM-PAC Scale): 42.13   CMS Modifier (G-Code): CK    FUNCTIONAL ABILITY STATUS  Gait Assessment   Gait Assistance: Minimum assistance  Distance (ft): 100  Assistive Device: Elverna Blanca

## 2017-07-22 NOTE — PROGRESS NOTES
ASSESSMENT/PLAN:     impression:   1. Acute coronary syndrome with multi vessel cad on cath and lv dysfunction         S/P CABG X 3 7/20/ 2017  2. Pulmonary hypertension by cath in January 2017.   Echo in June 2017 pulm htn resolved  and ef improved  3 Alcohol use: No                 REVIEW OF SYSTEMS:   CONS:denies fever, chills, significant weight change. CV:see HPI. RESP:denies chronic cough, hemoptysis. GI:denies melena, hematochezia. :denies hematuria. INTEG:no new rashes.  MS:no li

## 2017-07-23 LAB
ANION GAP SERPL CALC-SCNC: 7 MMOL/L (ref 0–18)
BASOPHILS # BLD: 0 K/UL (ref 0–0.2)
BASOPHILS NFR BLD: 0 %
BLOOD TYPE BARCODE: 5100
BLOOD TYPE BARCODE: 5100
BUN SERPL-MCNC: 31 MG/DL (ref 8–20)
BUN/CREAT SERPL: 25 (ref 10–20)
CALCIUM SERPL-MCNC: 8.3 MG/DL (ref 8.5–10.5)
CHLORIDE SERPL-SCNC: 105 MMOL/L (ref 95–110)
CO2 SERPL-SCNC: 27 MMOL/L (ref 22–32)
CREAT SERPL-MCNC: 1.24 MG/DL (ref 0.5–1.5)
EOSINOPHIL # BLD: 0 K/UL (ref 0–0.7)
EOSINOPHIL NFR BLD: 0 %
ERYTHROCYTE [DISTWIDTH] IN BLOOD BY AUTOMATED COUNT: 14.6 % (ref 11–15)
GLUCOSE BLDC GLUCOMTR-MCNC: 109 MG/DL (ref 70–99)
GLUCOSE BLDC GLUCOMTR-MCNC: 113 MG/DL (ref 70–99)
GLUCOSE BLDC GLUCOMTR-MCNC: 186 MG/DL (ref 70–99)
GLUCOSE BLDC GLUCOMTR-MCNC: 84 MG/DL (ref 70–99)
GLUCOSE BLDC GLUCOMTR-MCNC: 98 MG/DL (ref 70–99)
GLUCOSE SERPL-MCNC: 91 MG/DL (ref 70–99)
HCT VFR BLD AUTO: 24.8 % (ref 35–48)
HGB BLD-MCNC: 8.4 G/DL (ref 12–16)
LYMPHOCYTES # BLD: 1.6 K/UL (ref 1–4)
LYMPHOCYTES NFR BLD: 17 %
MCH RBC QN AUTO: 31.7 PG (ref 27–32)
MCHC RBC AUTO-ENTMCNC: 33.7 G/DL (ref 32–37)
MCV RBC AUTO: 94 FL (ref 80–100)
MONOCYTES # BLD: 1.1 K/UL (ref 0–1)
MONOCYTES NFR BLD: 12 %
NEUTROPHILS # BLD AUTO: 6.7 K/UL (ref 1.8–7.7)
NEUTROPHILS NFR BLD: 71 %
OSMOLALITY UR CALC.SUM OF ELEC: 294 MOSM/KG (ref 275–295)
PLATELET # BLD AUTO: 141 K/UL (ref 140–400)
PMV BLD AUTO: 8.8 FL (ref 7.4–10.3)
POTASSIUM SERPL-SCNC: 3.9 MMOL/L (ref 3.3–5.1)
RBC # BLD AUTO: 2.64 M/UL (ref 3.7–5.4)
SODIUM SERPL-SCNC: 139 MMOL/L (ref 136–144)
WBC # BLD AUTO: 9.4 K/UL (ref 4–11)

## 2017-07-23 PROCEDURE — 82962 GLUCOSE BLOOD TEST: CPT

## 2017-07-23 PROCEDURE — 97530 THERAPEUTIC ACTIVITIES: CPT

## 2017-07-23 PROCEDURE — 97116 GAIT TRAINING THERAPY: CPT

## 2017-07-23 PROCEDURE — 85025 COMPLETE CBC W/AUTO DIFF WBC: CPT | Performed by: THORACIC SURGERY (CARDIOTHORACIC VASCULAR SURGERY)

## 2017-07-23 PROCEDURE — 80048 BASIC METABOLIC PNL TOTAL CA: CPT | Performed by: THORACIC SURGERY (CARDIOTHORACIC VASCULAR SURGERY)

## 2017-07-23 RX ORDER — POTASSIUM CHLORIDE 20 MEQ/1
20 TABLET, EXTENDED RELEASE ORAL ONCE
Status: COMPLETED | OUTPATIENT
Start: 2017-07-23 | End: 2017-07-23

## 2017-07-23 RX ORDER — FUROSEMIDE 10 MG/ML
20 INJECTION INTRAMUSCULAR; INTRAVENOUS ONCE
Status: COMPLETED | OUTPATIENT
Start: 2017-07-23 | End: 2017-07-23

## 2017-07-23 RX ORDER — ATORVASTATIN CALCIUM 40 MG/1
40 TABLET, FILM COATED ORAL NIGHTLY
Status: DISCONTINUED | OUTPATIENT
Start: 2017-07-23 | End: 2017-07-26

## 2017-07-23 NOTE — PLAN OF CARE
Problem: Diabetes/Glucose Control  Goal: Glucose maintained within prescribed range  INTERVENTIONS:  - Monitor Blood Glucose as ordered  - Assess for signs and symptoms of hyperglycemia and hypoglycemia  - Administer ordered medications to maintain glucose document dressing/incision, wound bed, drain sites and surrounding tissue  - Implement wound care per orders  - Initiate isolation precautions as appropriate  - Initiate Pressure Ulcer prevention bundle as indicated   Outcome: Progressing  Patient with thr on type and severity of pain and evaluate response  - Implement non-pharmacological measures as appropriate and evaluate response  - Consider cultural and social influences on pain and pain management  - Manage/alleviate anxiety  - Utilize distraction and/ nutrition restrictions as appropriate   Outcome: Progressing      Problem: SAFETY ADULT - FALL  Goal: Free from fall injury  INTERVENTIONS:  - Assess pt frequently for physical needs  - Identify cognitive and physical deficits and behaviors that affect ris

## 2017-07-23 NOTE — PROGRESS NOTES
[facility]                                                            Progress Note    Glendy Buchanan Patient Status:  Inpatient    1960 MRN [id number]   Location [facility] 2W/SW Attending Julio C Osborn MD   [address] Day # 7 PCP Susan Crowder Saline Flush 0.9 % injection 10 mL, 10 mL, Intravenous, PRN  •  morphINE sulfate (PF) 2 MG/ML injection 2 mg, 2 mg, Intravenous, Once  •  LORazepam (ATIVAN) tab 1 mg, 1 mg, Oral, Once  •  Normal Saline Flush 0.9 % injection 10 mL, 10 mL, Intravenous, PRN aspirin EC tab 81 mg, 81 mg, Oral, Daily  •  Chlorhexidine Gluconate (PERIDEX) 0.12 % solution 15 mL, 15 mL, Mouth/Throat, BID  •  Enoxaparin Sodium (LOVENOX) 40 MG/0.4ML injection 40 mg, 40 mg, Subcutaneous, Daily  •  INSULIN STANDARD BOLUS FROM BAG 6.8 U pectoris ([facility] [address]     Acquired hypothyroidism     Hypertension goal BP (blood pressure) < 130/80     Hypercholesterolemia     Chronic systolic CHF (congestive heart failure) ([facility] [address]     Ischemic cardiomyopathy     Hyperlipidemia associated with type 2 diabetes

## 2017-07-23 NOTE — PLAN OF CARE
Problem: Diabetes/Glucose Control  Goal: Glucose maintained within prescribed range  INTERVENTIONS:  - Monitor Blood Glucose as ordered  - Assess for signs and symptoms of hyperglycemia and hypoglycemia  - Administer ordered medications to maintain glucose wound care per orders  - Initiate isolation precautions as appropriate  - Initiate Pressure Ulcer prevention bundle as indicated   Outcome: Progressing     Goal: Oral mucous membranes remain intact  INTERVENTIONS  - Assess oral mucosa and hygiene practices assistance  - Assess pain using appropriate pain scale  - Administer analgesics based on type and severity of pain and evaluate response  - Implement non-pharmacological measures as appropriate and evaluate response  - Consider cultural and social influenc interventions for patient's volume status, including labs, urine output, blood pressure (other measures as available)  - Encourage oral intake as appropriate  - Instruct patient on fluid and nutrition restrictions as appropriate   Outcome: Progressing  Lab

## 2017-07-23 NOTE — PROGRESS NOTES
[facility] Hospitalist Progress Note     CC: Hospital Follow up    PCP: Zina Cavazos MD       Assessment/Plan:   Ms. Kaci Boyd is a 61year old female with PMH sig for CAD s/p NSTEMI 1/2017, CHF (EF 35%) HLD, HTN, DM with neuropathy, hs of congential hand and lady [facility] hospitalist to continue to follow patient while in house     Patient and/or patient's family given opportunity to ask questions and note understanding and agreeing with therapeutic plan as outlined     Geneva Sam MD  [facility] Hospitalist  Answering Service n [id number]   RBC  3.36*  3.00*  2.64*   HGB  10.5*  9.3*  8.4*   HCT  31.2*  28.0*  24.8*   MCV  92.6  93.4  94.0   MCH  31.3  30.9  31.7   MCHC  33.8  33.1  33.7   RDW  14.8  14.7  14.6   WBC  12.1*  13.3*  9.4   PLT  152  165  141         Recent Labs   Lab  07 Daily   • insulin (NOVOLIN R) bolus from bag  0.1 Units/kg Intravenous Once   • Amiodarone HCl  200 mg Oral TID CC   • [MAR Hold] furosemide  20 mg Oral Nightly        Normal Saline Flush, Normal Saline Flush, temazepam, Albumin Human, magnesium hydroxide,

## 2017-07-23 NOTE — PLAN OF CARE
Problem: Diabetes/Glucose Control  Goal: Glucose maintained within prescribed range  INTERVENTIONS:  - Monitor Blood Glucose as ordered  - Assess for signs and symptoms of hyperglycemia and hypoglycemia  - Administer ordered medications to maintain glucose SKIN/TISSUE INTEGRITY - ADULT  Goal: Incision(s), wounds(s) or drain site(s) healing without S/S of infection  INTERVENTIONS:  - Assess and document risk factors for pressure ulcer development  - Assess and document skin integrity  - Assess and document dr increasing activity/tolerance for mobility and gait  - Educate and engage patient/family in tolerated activity level and precautions   Outcome: Progressing      Problem: PAIN - ADULT  Goal: Verbalizes/displays adequate comfort level or patient's stated miguel a as needed  - Establish a toileting routine/schedule  - Consider collaborating with pharmacy to review patient's medication profile   Outcome: Progressing  BS +, passing flatus, no BM this shift. meds given as ordered. Denies nausea/vomiting.  CPM    Problem schedule   Outcome: Progressing  Bed in lowest/locked position, side rails up x3, call light in reach. Pt is oriented to abilities and limitations and calls appropriately for assistance. No falls/injury noted at this time.  CPM

## 2017-07-23 NOTE — PROGRESS NOTES
assessment and plan:   Seen at bedside with Dr Yaquelin Connolly     S/P CABG x 3 with LIMA - LAD, SVG - RCA, SVG - OM.  POD #3     oob and ambulating in halls this morning  Pain medication as tolerated  IS and Acapella use 8-10 times an hour  DVT prophylaxis - SCDs an

## 2017-07-23 NOTE — DISCHARGE PLANNING
SW was notified the pt. Would like to rehab where her mother is rehabbing. The pt's mother is currently on the 5th floor. PT is recommending rehab. Referral has been made to [facility].  DON screen has been requested.   The pt's insurance requ

## 2017-07-23 NOTE — PROGRESS NOTES
[facility] PULMONARY/CRITICAL CARE    S: Pt is doing well. No complaints.      Meds:  • [START ON 7/24/2017] insulin detemir  50 Units Subcutaneous Daily   • atorvastatin  40 mg Oral Nightly   • ceFAZolin  2 g Intravenous Q12H   • ferrous sulfate  325 mg Oral TID Flow Rate (L/min): 1 L/min  Pulse Oximetry Type: Intermittent    FiO2 (%): 40 %      I/O last 3 completed shifts: In: 2362 [P.O.:840; I.V.:1522]  Out: 2105 [Urine:1875; Chest Tube:230]  No intake/output data recorded.     Gen - Alert, oriented x 3, in no a

## 2017-07-23 NOTE — PHYSICAL THERAPY NOTE
PHYSICAL THERAPY TREATMENT NOTE - INPATIENT    Room Number: 562/716-H            Problem List  Principal Problem:    Acute chest pain  Active Problems:    Ischemic cardiomyopathy    Anemia    Acute kidney injury ([facility] [address]    Azotemia      ASSESSMENT     Ashlee Moving from lying on back to sitting on the side of the bed?: A Little   How much help from another person does the patient currently need. ..   -   Moving to and from a bed to a chair (including a wheelchair)?: A Little   -   Need to walk in hospital room?

## 2017-07-24 ENCOUNTER — APPOINTMENT (OUTPATIENT)
Dept: GENERAL RADIOLOGY | Facility: HOSPITAL | Age: 57
DRG: 234 | End: 2017-07-24
Attending: THORACIC SURGERY (CARDIOTHORACIC VASCULAR SURGERY)
Payer: MEDICAID

## 2017-07-24 LAB
ANION GAP SERPL CALC-SCNC: 6 MMOL/L (ref 0–18)
BASOPHILS # BLD: 0 K/UL (ref 0–0.2)
BASOPHILS NFR BLD: 0 %
BUN SERPL-MCNC: 27 MG/DL (ref 8–20)
BUN/CREAT SERPL: 23.5 (ref 10–20)
CALCIUM SERPL-MCNC: 8.6 MG/DL (ref 8.5–10.5)
CHLORIDE SERPL-SCNC: 106 MMOL/L (ref 95–110)
CO2 SERPL-SCNC: 26 MMOL/L (ref 22–32)
CREAT SERPL-MCNC: 1.15 MG/DL (ref 0.5–1.5)
EOSINOPHIL # BLD: 0.2 K/UL (ref 0–0.7)
EOSINOPHIL NFR BLD: 2 %
ERYTHROCYTE [DISTWIDTH] IN BLOOD BY AUTOMATED COUNT: 14.1 % (ref 11–15)
GLUCOSE BLDC GLUCOMTR-MCNC: 115 MG/DL (ref 70–99)
GLUCOSE BLDC GLUCOMTR-MCNC: 118 MG/DL (ref 70–99)
GLUCOSE BLDC GLUCOMTR-MCNC: 194 MG/DL (ref 70–99)
GLUCOSE BLDC GLUCOMTR-MCNC: 81 MG/DL (ref 70–99)
GLUCOSE SERPL-MCNC: 92 MG/DL (ref 70–99)
HCT VFR BLD AUTO: 24.4 % (ref 35–48)
HGB BLD-MCNC: 8.3 G/DL (ref 12–16)
LYMPHOCYTES # BLD: 1.6 K/UL (ref 1–4)
LYMPHOCYTES NFR BLD: 19 %
MAGNESIUM SERPL-MCNC: 2 MG/DL (ref 1.8–2.5)
MCH RBC QN AUTO: 31.9 PG (ref 27–32)
MCHC RBC AUTO-ENTMCNC: 34.1 G/DL (ref 32–37)
MCV RBC AUTO: 93.6 FL (ref 80–100)
MONOCYTES # BLD: 1 K/UL (ref 0–1)
MONOCYTES NFR BLD: 12 %
NEUTROPHILS # BLD AUTO: 5.7 K/UL (ref 1.8–7.7)
NEUTROPHILS NFR BLD: 67 %
OSMOLALITY UR CALC.SUM OF ELEC: 291 MOSM/KG (ref 275–295)
PLATELET # BLD AUTO: 193 K/UL (ref 140–400)
PMV BLD AUTO: 8.4 FL (ref 7.4–10.3)
POTASSIUM SERPL-SCNC: 4.2 MMOL/L (ref 3.3–5.1)
RBC # BLD AUTO: 2.61 M/UL (ref 3.7–5.4)
SODIUM SERPL-SCNC: 138 MMOL/L (ref 136–144)
WBC # BLD AUTO: 8.6 K/UL (ref 4–11)

## 2017-07-24 PROCEDURE — 93005 ELECTROCARDIOGRAM TRACING: CPT

## 2017-07-24 PROCEDURE — 36592 COLLECT BLOOD FROM PICC: CPT

## 2017-07-24 PROCEDURE — 85025 COMPLETE CBC W/AUTO DIFF WBC: CPT | Performed by: THORACIC SURGERY (CARDIOTHORACIC VASCULAR SURGERY)

## 2017-07-24 PROCEDURE — 71020 XR CHEST PA + LAT CHEST (CPT=71020): CPT | Performed by: THORACIC SURGERY (CARDIOTHORACIC VASCULAR SURGERY)

## 2017-07-24 PROCEDURE — 93010 ELECTROCARDIOGRAM REPORT: CPT | Performed by: INTERNAL MEDICINE

## 2017-07-24 PROCEDURE — 80048 BASIC METABOLIC PNL TOTAL CA: CPT | Performed by: THORACIC SURGERY (CARDIOTHORACIC VASCULAR SURGERY)

## 2017-07-24 PROCEDURE — 83735 ASSAY OF MAGNESIUM: CPT | Performed by: NURSE PRACTITIONER

## 2017-07-24 PROCEDURE — 82962 GLUCOSE BLOOD TEST: CPT

## 2017-07-24 RX ORDER — POTASSIUM CHLORIDE 20 MEQ/1
20 TABLET, EXTENDED RELEASE ORAL ONCE
Status: COMPLETED | OUTPATIENT
Start: 2017-07-24 | End: 2017-07-24

## 2017-07-24 RX ORDER — POLYETHYLENE GLYCOL 3350 17 G/17G
17 POWDER, FOR SOLUTION ORAL DAILY
Status: DISCONTINUED | OUTPATIENT
Start: 2017-07-24 | End: 2017-07-26

## 2017-07-24 RX ORDER — FUROSEMIDE 10 MG/ML
20 INJECTION INTRAMUSCULAR; INTRAVENOUS ONCE
Status: COMPLETED | OUTPATIENT
Start: 2017-07-24 | End: 2017-07-24

## 2017-07-24 RX ORDER — FUROSEMIDE 20 MG/1
20 TABLET ORAL DAILY
Status: CANCELLED | OUTPATIENT
Start: 2017-07-24

## 2017-07-24 NOTE — DIABETES ED
[facility]    Diabetes Education  Note    Josesito Casper Patient Status:  Inpatient   1960 MRN [id number]  Location [facility] 2W/SW Attending Gene Jay MD  [facility] Day # 8 PCP Rita Gonsalez MD    Reason for Visit: Md holloway t

## 2017-07-24 NOTE — PROGRESS NOTES
[facility] Hospitalist Progress Note     CC: Hospital Follow up    PCP: Anita Sky MD       Assessment/Plan:   Ms. Ching Shahid is a 61year old female with PMH sig for CAD s/p NSTEMI 1/2017, CHF (EF 35%) HLD, HTN, DM with neuropathy, hs of congential hand and lady recovery, will need rehab     Further recommendations pending patient's clinical course.   [facility] hospitalist to continue to follow patient while in house     Patient and/or patient's family given opportunity to ask questions and note understanding and agreein dorsal aspect L foot is resolved      Data Review:       Labs:     Recent Labs   Lab  07/22/17   0933  07/23/17   0537  07/24/17   0527   RBC  3.00*  2.64*  2.61*   HGB  9.3*  8.4*  8.3*   HCT  28.0*  24.8*  24.4*   MCV  93.4  94.0  93.6   MCH  30.9  31.7 **OR** HYDROcodone-acetaminophen, morphINE sulfate **OR** morphINE sulfate **OR** morphINE sulfate, [MAR Hold] dextrose 50%, [MAR Hold] Glucose-Vitamin C

## 2017-07-24 NOTE — ADDENDUM NOTE
Addendum  created 07/24/17 0913 by Sandi Emery MD    Anesthesia Intra Blocks edited, Sign clinical note

## 2017-07-24 NOTE — ADDENDUM NOTE
Addendum  created 07/24/17 0915 by Obie Oshea MD    Anesthesia Intra Blocks edited, Sign clinical note

## 2017-07-24 NOTE — PHYSICAL THERAPY NOTE
Attempted treatment pt is on hold today per RN due to pt going into A-fib. Will attempt tomorrow if appropriate.

## 2017-07-24 NOTE — DISCHARGE PLANNING
LISA following up on d/c planning for the patient. LISA spoke with [facility] and they can accept the patient, but they are checking if the patient's insurance covers therapy. Insurance process being started with [employer] as well.      Marlen Dorantes LCS

## 2017-07-24 NOTE — PROGRESS NOTES
Ancef (cefazolin) dosing had been previously adjusted by pharmacy due to renal insufficiency for [employer]. Estimated Creatinine Clearance: 44.1 mL/min (based on SCr of 1.15 mg/dL).     Due to improved renal function, the dose of Ancef (cefazolin) ha

## 2017-07-24 NOTE — PLAN OF CARE
Problem: Diabetes/Glucose Control  Goal: Glucose maintained within prescribed range  INTERVENTIONS:  - Monitor Blood Glucose as ordered  - Assess for signs and symptoms of hyperglycemia and hypoglycemia  - Administer ordered medications to maintain glucose wound care per orders  - Initiate isolation precautions as appropriate  - Initiate Pressure Ulcer prevention bundle as indicated   Outcome: Progressing     Goal: Oral mucous membranes remain intact  INTERVENTIONS  - Assess oral mucosa and hygiene practices pain using appropriate pain scale  - Administer analgesics based on type and severity of pain and evaluate response  - Implement non-pharmacological measures as appropriate and evaluate response  - Consider cultural and social influences on pain and pain m safety including physical limitations  - Instruct pt to call for assistance with activity based on assessment  - Modify environment to reduce risk of injury  - Provide assistive devices as appropriate  - Consider OT/PT consult to assist with strengthening/

## 2017-07-24 NOTE — PROGRESS NOTES
Misc. Note    Steve Chappell NP  2017  [facility]    Progress Note    Linh Peter Patient Status:  Inpatient    1960 MRN [id number]   Location [facility] 2W/SW Attending Nina Escobedo MD   [facility] Day # 8 PCP Francy Boyer intact  Psychiatric: calm  Sternum incision prevena vac in place; left thigh prevena vac in place X2  Pulmonary:  Left base diminished; right clear throughout  Cardiovascular: S1, S2 NSR with bigeminy PACs  Abdominal: soft, non-tender;+ bowel sounds; + fla

## 2017-07-24 NOTE — PHYSICAL THERAPY NOTE
Attempted to see pt this PM but per RN the pt had just returned to bed from ambulating and was going to have her pacer wires removed. Will attempt later as time permits.

## 2017-07-24 NOTE — PROGRESS NOTES
ASSESSMENT/PLAN:     impression:   1. Acute coronary syndrome with multi vessel cad on cath and lv dysfunction         S/P CABG X 3 7/20/ 2017; steady progress  2. Pulmonary hypertension by cath in January 2017.   Echo in June 2017 pulm htn resolved  a Used                      Alcohol use: No                 REVIEW OF SYSTEMS:   CONS:denies fever, chills, significant weight change. CV:see HPI. RESP:denies chronic cough, hemoptysis. GI:denies melena, hematochezia. :denies hematuria.  INTEG:no new rashes

## 2017-07-24 NOTE — CM/SW NOTE
CTL update for progression of care. [employer] [facility] [employer] preauthorization for [employer] submitted both through the [employer] portal and [employer]. Received a phone call from [facility] RN reporting that patient  is under Reference # [id number].   Tentative d/c ema

## 2017-07-25 LAB
GLUCOSE BLDC GLUCOMTR-MCNC: 127 MG/DL (ref 70–99)
GLUCOSE BLDC GLUCOMTR-MCNC: 130 MG/DL (ref 70–99)
GLUCOSE BLDC GLUCOMTR-MCNC: 134 MG/DL (ref 70–99)
GLUCOSE BLDC GLUCOMTR-MCNC: 227 MG/DL (ref 70–99)

## 2017-07-25 PROCEDURE — 97530 THERAPEUTIC ACTIVITIES: CPT

## 2017-07-25 PROCEDURE — 97535 SELF CARE MNGMENT TRAINING: CPT

## 2017-07-25 PROCEDURE — 97116 GAIT TRAINING THERAPY: CPT

## 2017-07-25 PROCEDURE — 82962 GLUCOSE BLOOD TEST: CPT

## 2017-07-25 RX ORDER — DILTIAZEM HYDROCHLORIDE 5 MG/ML
10 INJECTION INTRAVENOUS ONCE
Status: COMPLETED | OUTPATIENT
Start: 2017-07-25 | End: 2017-07-25

## 2017-07-25 RX ORDER — CLOPIDOGREL BISULFATE 75 MG/1
75 TABLET ORAL DAILY
Qty: 90 TABLET | Refills: 0 | Status: SHIPPED | OUTPATIENT
Start: 2017-07-25 | End: 2017-08-21

## 2017-07-25 RX ORDER — METOPROLOL SUCCINATE 25 MG/1
25 TABLET, EXTENDED RELEASE ORAL DAILY
Qty: 30 TABLET | Refills: 11 | Status: SHIPPED | OUTPATIENT
Start: 2017-07-25 | End: 2018-09-26

## 2017-07-25 RX ORDER — MELATONIN
325
Qty: 90 TABLET | Refills: 0 | Status: SHIPPED | OUTPATIENT
Start: 2017-07-25 | End: 2017-08-21

## 2017-07-25 RX ORDER — LISINOPRIL 10 MG/1
10 TABLET ORAL DAILY
Qty: 30 TABLET | Refills: 0 | Status: SHIPPED | OUTPATIENT
Start: 2017-07-25 | End: 2017-08-09 | Stop reason: DRUGHIGH

## 2017-07-25 RX ORDER — AMIODARONE HYDROCHLORIDE 200 MG/1
200 TABLET ORAL 2 TIMES DAILY
Qty: 60 TABLET | Refills: 0 | Status: SHIPPED | OUTPATIENT
Start: 2017-07-25 | End: 2017-08-21

## 2017-07-25 RX ORDER — DILTIAZEM HYDROCHLORIDE 5 MG/ML
INJECTION INTRAVENOUS
Status: COMPLETED
Start: 2017-07-25 | End: 2017-07-25

## 2017-07-25 RX ORDER — DOXEPIN HYDROCHLORIDE 50 MG/1
1 CAPSULE ORAL DAILY
Qty: 30 TABLET | Refills: 0 | Status: SHIPPED | OUTPATIENT
Start: 2017-07-25 | End: 2017-08-21

## 2017-07-25 RX ORDER — ASCORBIC ACID 500 MG
500 TABLET ORAL 3 TIMES DAILY
Qty: 90 TABLET | Refills: 0 | Status: SHIPPED | OUTPATIENT
Start: 2017-07-25 | End: 2017-08-21

## 2017-07-25 RX ORDER — CEPHALEXIN 500 MG/1
500 CAPSULE ORAL 2 TIMES DAILY
Qty: 14 CAPSULE | Refills: 0 | Status: SHIPPED | OUTPATIENT
Start: 2017-07-25 | End: 2017-07-25

## 2017-07-25 RX ORDER — ASPIRIN 81 MG/1
81 TABLET ORAL DAILY
Qty: 90 TABLET | Refills: 0 | Status: ON HOLD | OUTPATIENT
Start: 2017-07-25 | End: 2018-08-06 | Stop reason: DRUGHIGH

## 2017-07-25 RX ORDER — PEN NEEDLE, DIABETIC 30 GX3/16"
NEEDLE, DISPOSABLE MISCELLANEOUS
Qty: 100 EACH | Refills: 0 | Status: SHIPPED | OUTPATIENT
Start: 2017-07-25 | End: 2018-06-13

## 2017-07-25 RX ORDER — HYDROCODONE BITARTRATE AND ACETAMINOPHEN 5; 325 MG/1; MG/1
2 TABLET ORAL EVERY 4 HOURS PRN
Qty: 30 TABLET | Refills: 0 | Status: SHIPPED | OUTPATIENT
Start: 2017-07-25 | End: 2017-08-21

## 2017-07-25 NOTE — PLAN OF CARE
Problem: Diabetes/Glucose Control  Goal: Glucose maintained within prescribed range  INTERVENTIONS:  - Monitor Blood Glucose as ordered  - Assess for signs and symptoms of hyperglycemia and hypoglycemia  - Administer ordered medications to maintain glucose precautions as appropriate  - Initiate Pressure Ulcer prevention bundle as indicated   Outcome: Progressing    Goal: Oral mucous membranes remain intact  INTERVENTIONS  - Assess oral mucosa and hygiene practices  - Implement preventative oral hygiene regim influences on pain and pain management  - Manage/alleviate anxiety  - Utilize distraction and/or relaxation techniques  - Monitor for opioid side effects  - Notify MD/LIP if interventions unsuccessful or patient reports new pain   Outcome: Progressing  Med toileting schedule   Outcome: Progressing

## 2017-07-25 NOTE — PROGRESS NOTES
ASSESSMENT/PLAN:     impression:   1. Acute coronary syndrome with multi vessel cad on cath and lv dysfunction         S/P CABG X 3 7/20/ 2017; steady progress  2. Pulmonary hypertension by cath in January 2017.   Echo in June 2017 pulm htn resolved  a • Hyperlipidemia    • Ischemic cardiomyopathy 2/27/2017   • Neuropathy (HCC)    • Thyroid disease        Family History   Problem Relation Age of Onset   • Heart Disorder Father 61     MI tob   • Hypertension Father    • Lipids Father    • Heart Disorde EKGs. New post op rbbbb  Labs reviewed:                 GREG Abbott

## 2017-07-25 NOTE — CARDIAC REHAB
Cardiac Rehab Phase I    Activity:   Chair: Yes   Ambulation: Yes   Assistive Device: No   Distance: 300 feet   Assistance needed: No   Patient tolerated activity: Well. Shower Date: 7/25/17 Tolerated Shower Activity Well.     Education:  Handouts provide

## 2017-07-25 NOTE — OCCUPATIONAL THERAPY NOTE
OCCUPATIONAL THERAPY TREATMENT NOTE - INPATIENT     Room Number: 651/445-O          Presenting Problem: CABG x3    Problem List  Principal Problem:    Acute chest pain  Active Problems:    Ischemic cardiomyopathy    Anemia    Acute kidney injury ([facility] [address]    A such as brushing teeth?: None  -   Eating meals?: None    AM-PAC Score:  Score: 20  Approx Degree of Impairment: 38.32%  Standardized Score (AM-PAC Scale): 42.03  CMS Modifier (G-Code): CJ    FUNCTIONAL TRANSFER ASSESSMENT  Supine to Sit : Not tested  Sit

## 2017-07-25 NOTE — PROGRESS NOTES
[facility]    Progress Note    Yasmany Muro Patient Status:  Inpatient    1960 MRN [id number]   Location [facility] 2W/SW Attending Bola Fan MD   [facility] Day # 9 PCP Liseth Enamorado MD     Subjective:  Pt.  Sitting in chair There are other family members who live with the patients sister. According to the patient, her father is very independent and does assist taking care of her mother.   Discussed home status with Cardiac Rehab RN who states she thinks patient can go home and

## 2017-07-25 NOTE — CM/SW NOTE
CTL met with the patient at bedside regarding progression of care. Per RN, patient went into A-fib on Amiodarone. Patient's discharge is on hold. HH  vs. Rehab.   Tentative discharge plan is that Royer Gardner will stay with her sister who lives next door and her

## 2017-07-25 NOTE — PHYSICAL THERAPY NOTE
PHYSICAL THERAPY TREATMENT NOTE - INPATIENT    Room Number: 778/682-O            Problem List  Principal Problem:    Acute chest pain  Active Problems:    Ischemic cardiomyopathy    Anemia    Acute kidney injury ([facility] [address]    Azotemia      ASSESSMENT   RN noti Static Sitting: Good  Dynamic Sitting: Good           Static Standing: Good  Dynamic Standing: Fair +      AM-PAC '6-Clicks' INPATIENT SHORT FORM - BASIC MOBILITY  How much difficulty does the patient currently have. ..  -   Turning over in be during ambulation   Goal #4 Patient will negotiate 10 stairs/one curb w/ assistive device and supervision   Goal #4   Current Status CGA x 12 steps with one hand rail and single step approach   Goal #5 Patient to demonstrate independence with home activity

## 2017-07-25 NOTE — DISCHARGE PLANNING
SW informed that the peer to peer is scheduled for 1pm today for possible rehab placement. However, the d/c plan may change to home with [facility] Jurado. Referrals placed to [facility] AND [facility]. Pending sale to [facility] is unable to accept the patient at this time.       2:4

## 2017-07-25 NOTE — PROGRESS NOTES
[facility] Hospitalist Progress Note     CC: Hospital Follow up    PCP: Trisha Kirkland MD       Assessment/Plan:   Ms. Darryle Grad is a 61year old female with PMH sig for CAD s/p NSTEMI 1/2017, CHF (EF 35%) HLD, HTN, DM with neuropathy, hs of congential hand and lady rehab     Further recommendations pending patient's clinical course.   [facility] hospitalist to continue to follow patient while in house     Patient and/or patient's family given opportunity to ask questions and note understanding and agreeing with therapeutic p swelling on dorsal aspect L foot is resolved      Data Review:       Labs:     Recent Labs   Lab  07/23/17   0537  07/24/17   0527   RBC  2.64*  2.61*   HGB  8.4*  8.3*   HCT  24.8*  24.4*   MCV  94.0  93.6   MCH  31.7  31.9   MCHC  33.7  34.1   RDW  14.6 sulfate **OR** morphINE sulfate **OR** morphINE sulfate, [MAR Hold] dextrose 50%, [MAR Hold] Glucose-Vitamin C

## 2017-07-26 VITALS
SYSTOLIC BLOOD PRESSURE: 144 MMHG | TEMPERATURE: 98 F | BODY MASS INDEX: 26.93 KG/M2 | WEIGHT: 152 LBS | HEIGHT: 63 IN | HEART RATE: 78 BPM | OXYGEN SATURATION: 96 % | RESPIRATION RATE: 23 BRPM | DIASTOLIC BLOOD PRESSURE: 87 MMHG

## 2017-07-26 LAB
GLUCOSE BLDC GLUCOMTR-MCNC: 119 MG/DL (ref 70–99)
GLUCOSE BLDC GLUCOMTR-MCNC: 158 MG/DL (ref 70–99)

## 2017-07-26 PROCEDURE — 82962 GLUCOSE BLOOD TEST: CPT

## 2017-07-26 NOTE — CARDIAC REHAB
Cardiac Rehab Phase I    Activity:   Chair: Yes   Ambulation: Yes   Assistive Device: No   Distance: 300 feet   Assistance needed: No   Patient tolerated activity: Well. Shower Date:  Tolerated Shower Activity .     Education:  Handouts provided and revie

## 2017-07-26 NOTE — PROGRESS NOTES
ASSESSMENT/PLAN:     impression:   1. Acute coronary syndrome with multi vessel cad on cath and lv dysfunction         S/P CABG X 3 7/20/ 2017; steady progress  2. Pulmonary hypertension by cath in January 2017.   Echo in June 2017 pulm htn resolved  a each Rfl: 0   lisinopril 10 MG Oral Tab Take 1 tablet (10 mg total) by mouth daily.  Disp: 30 tablet Rfl: 0      Past Medical History:   Diagnosis Date   • [facility] systolic [facility] 2/27/2017   • Clubfoot, congenital     right ET OG tube NECK:R IJ line . RESP:normal rate and rhythm, clear to auscultation. GI:soft, non-tender;rectal deferred.  :afoley  EXT:no clubbing or cyanosis SCD boots . SKIN: sternal incision w/o drainage . NEURO/PSYCH:alert and oriented. Normal affect.

## 2017-07-26 NOTE — PROGRESS NOTES
[facility]    Progress Note    Josesito Casper Patient Status:  Inpatient    1960 MRN [id number]   Location [facility] 2W/SW Attending Gene Jay MD   Hosp Day # 10 PCP Rita Gonsalez MD     Subjective:  Pt.  Sitting in the due to ANGI. pt. plans to stay with her sister. Anticipate home today. [facility] visits arranged. Follow up appts made. Written and verbal discharge info regarding recovery care provided. All questions answered.    Arianna Martell  7/26/2017  8:42 AM

## 2017-07-26 NOTE — FACE TO FACE NOTE
[facility]  Face to Face Encounter Note    Christofer Tomas Patient Status:  Inpatient    1960 MRN [id number]   Location [facility] 2W/SW Attending No att. providers found   [facility] Day # 10 PCP Melva Madison MD       I, or a non-physici including all assistive devices being used, assistance needed from others, special transportation required or medical contraindications for leaving the home.   Also describe the signs and symptoms and risk factors that make it a taxing effort for the patien

## 2017-07-26 NOTE — DISCHARGE PLANNING
SW informed that the patient may be ready for d/c today to home with [place] [id number].   [place] [id number] referrals pursued:  1) [facility]- unable to accept  [facility]) [facility]- unable to accept  3) [facility]- unable to accept  4) [facility]- unable to accept  5) [facility]- pending  6) [facility]- pendi

## 2017-07-26 NOTE — PLAN OF CARE
Problem: CARDIOVASCULAR - ADULT  Goal: Maintains optimal cardiac output and hemodynamic stability  INTERVENTIONS:  - Monitor vital signs, rhythm, and trends  - Monitor for bleeding, hypotension and signs of decreased cardiac output  - Evaluate effectivenes MUSCULOSKELETAL - ADULT  Goal: Return mobility to safest level of function  INTERVENTIONS:  - Assess patient stability and activity tolerance for standing, transferring and ambulating w/ or w/o assistive devices  - Assist with transfers and ambulation usin ELECTROLYTES - ADULT  Goal: Electrolytes maintained within normal limits  INTERVENTIONS:  - Monitor labs and rhythm and assess patient for signs and symptoms of electrolyte imbalances  - Administer electrolyte replacement as ordered  - Monitor response to

## 2017-07-26 NOTE — OCCUPATIONAL THERAPY NOTE
OCCUPATIONAL THERAPY TREATMENT NOTE - INPATIENT     Room Number: 857/850-Z          Presenting Problem: CABG x3    Problem List  Principal Problem:    Acute chest pain  Active Problems:    Ischemic cardiomyopathy    Anemia    Acute kidney injury ([facility] [address]    A 0%  Standardized Score (AM-PAC Scale): 57.54  CMS Modifier (G-Code): CH    FUNCTIONAL TRANSFER ASSESSMENT  Supine to Sit : Supervision  Sit to Stand: Supervision    Toilet Transfer: SBA  Chair Transfer: SBA    Bedroom Mobility: SBA without AD    FUNCTIONAL

## 2017-07-27 ENCOUNTER — TELEPHONE (OUTPATIENT)
Dept: CARDIOLOGY UNIT | Facility: HOSPITAL | Age: 57
End: 2017-07-27

## 2017-07-27 NOTE — DISCHARGE SUMMARY
General Medicine Discharge Summary     Patient ID:  Josesito Casper  61year old  1/12/1960    Admit date: 7/16/2017    Discharge date and time: 7/26/2017  2:09 PM     Attending Physician: No att. providers found     Consults: Kel Morrell 13 distal lesions not amenable to stenting. Planned for either outpatient revascularization vs. CABG. She had repeat TTE on 6/28/17 with EF 30-35%.      Hospital Course:     Ms. Savage Sykes is a 61year old female with PMH sig for CAD s/p NSTEMI 1/2017, CHF (EF 35%) admit is 7.4  - resume metformin  - BS elevated post CABG  - DC on levemir 10 units daily  - patient to monitor BS closely, and call PCP if BS < 70 or > 250  - can likely be weaned off of this soon     HTN-   - resumed     HLD  -statin        Operative Pro these medications    atorvastatin 40 MG Tabs  Commonly known as:  LIPITOR  Take 1 tablet (40 mg total) by mouth nightly. furosemide 20 MG Tabs  Commonly known as:  LASIX  Take 1 tablet (20 mg total) by mouth daily.      Levothyroxine Sodium 50 MCG Tabs [address] [facility]           Gerald Espinoza MD On 8/14/2017.     Specialties:  Internal Medicine, PEDIATRICS  Why:  Appointment time: 10:00am  Contact information:  [address] [place] [address] 1 tablet by mouth daily. Pen Needles 31G X 8 MM Misc  Taken insulin once daily as directed        CHANGE how you take these medications    lisinopril 10 MG Tabs  Commonly known as:  PRINIVIL,ZESTRIL  Take 1 tablet (10 mg total) by mouth daily.   What ch

## 2017-07-28 ENCOUNTER — TELEPHONE (OUTPATIENT)
Dept: MEDSURG UNIT | Facility: HOSPITAL | Age: 57
End: 2017-07-28

## 2017-07-31 NOTE — PAYOR COMM NOTE
--------------  CONTINUED STAY REVIEW    Payor: Amos Maldonado #:  [id number]  Authorization Number: [id number]    Admit date: 7/16/17  Admit time: [address]    Admitting Physician: Danette Flores MD  Attending [employer] meds when ok with cards/[employer] post-op     HLD  -statin     FN:  - IVF: per protocol  - Diet: advance per surgery     DVT Prophy: lovenox  Lines: PIV     Dispo: pending CABG recovery, likely will need rehab     Further recommendations pending patient's clinic normal  SKIN: warm, dry  EXT: no edema, mild L lateral foot edema has resolved, mild soft tissue swelling on dorsal aspect L foot with mild asoc TTP        Data Review:       Labs:            Recent Labs   Lab  07/20/17   1716  07/21/17   0420  07/22/17 tablet Oral Daily   • Vitamin C  500 mg Oral TID   • mupirocin  1 Application Nasal BID   • Clopidogrel Bisulfate  75 mg Oral Daily   • aspirin  81 mg Oral Daily   • Chlorhexidine Gluconate  15 mL Mouth/Throat BID   • enoxaparin  40 mg Subcutaneous Daily Hypertension Father     • Lipids Father     • Heart Disorder Mother 80       cabg    • hypothyroid [OTHER] Daughter     • Heart Disease Other     • Diabetes Other     • High Blood Pressure Other     • Other [OTHER] Other        Family History of Premature ambulating in halls this morning  Pain medication as tolerated  IS and Acapella use 8-10 times an hour  DVT prophylaxis - SCDs and lovenox sq  PT and OT following  Patient with history of severe diabetes, levemir adjusted as needed  Expected post op volume appreciated  - per podiatry will continue ancef as inpatient with transition to po cephalosporin on d/c to complete 10 day course  - can f/u as o/p with Dr. Oliver Collins     Systolic CHF without exacerbation  - EF 30-35% in 6/28/17  - euvolemic  - resume BB, AC kg)  07/20/17 0659 : 152 lb 3.2 oz (69 kg)  07/19/17 0558 : 150 lb 4.8 oz (68.2 kg)  07/18/17 0610 : 154 lb 9.6 oz (70.1 kg)  07/17/17 0500 : 152 lb 12.8 oz (69.3 kg)  07/16/17 1903 : 153 lb 4.8 oz (69.5 kg)  07/16/17 1318 : 155 lb (70.3 kg)  06/06/17 1350 as above.                Meds:      • [START ON 7/24/2017] insulin detemir  50 Units Subcutaneous Daily   • atorvastatin  40 mg Oral Nightly   • ceFAZolin  2 g Intravenous Q12H   • ferrous sulfate  325 mg Oral TID CC   • Insulin Aspart Pen  4 Units Subcuta Pen  1-7 Units Subcutaneous TID CC   • Levothyroxine Sodium  50 mcg Oral Before breakfast   • morphINE sulfate (PF)  2 mg Intravenous Once   • LORazepam  1 mg Oral Once   • metoprolol tartrate  25 mg Oral 2x Daily(Beta Blocker)   • Metoclopramide HCl  5 mg discharge; if still here on POD #7 shower on day seven remove prevena vacs  Discharge Plan; [facility] rehab next 24 hours  Remove pacer wire  Education; IS/acapella; monitor heart rate; discharge next 24 hours; sternum precautions           Results:      L NT/ND, BS+x4, +flatus, +BM   Extremities: Warm, dry, no LE edema bilat  Pulses: 1+ bilat DP  Skin: sternotomy w/Provena drsg intact; leg drsg: CDI   Neurological:  AAOx3     Assessment/Plan:  S/P CABG x 3 POD # 5  Encourage pulm toilet: IS & Acapella  Pain cellulitis without abscess and tenosynovitis and myositis, no osteo seen  - podiatry following, as cellulitis improved no contraindication to surgery, assist appreciated  - per podiatry will continue ancef transition to po cephalosporin on d/c to complete 884 ml         Wt Readings from Last 3 Encounters:  07/26/17 : 152 lb (68.9 kg)  06/06/17 : 153 lb (69.4 kg)  05/02/17 : 150 lb (68 kg)        Allergies:  No Known Allergies     Labs:        Physical Exam:  Blood pressure 139/78, pulse 72, temperature Signed    :  Latricia Driver MD (Physician)         General Medicine Discharge Summary     Patient ID:[TN.1]  Balwinder Chamberlain  61year old[TN.2]  1/12/1960    Admit date: 7/16/2017    Discharge date and time: 7/26/2017  2:09 PM     Attending Physician: Yahaira padgett 1-2 months for CHF follow-up, has 3 vessel disease seen on cardiac cath from 1/2017 with distal lesions not amenable to stenting. Planned for either outpatient revascularization vs. CABG. She had repeat TTE on 6/28/17 with EF 30-35%.      Hospital Course: 1. 3-1.7  - cre 1.1 check prn     DM2  - was on metformin, a1c 6.9 in January  - A1C this admit is 7.4  - resume metformin  - BS elevated post CABG  - DC on levemir 10 units daily  - patient to monitor BS closely, and call PCP if BS < 70 or > 250  - can lik tablet (10 mg total) by mouth daily. What changed:  · medication strength  · how much to take        CONTINUE taking these medications    atorvastatin 40 MG Tabs  Commonly known as:  LIPITOR  Take 1 tablet (40 mg total) by mouth nightly.      furosemide 20 Why:  Appointment time: 11:00am  Contact information:  [address] [place] [address]           Denny Guerrero MD On 8/14/2017.     Specialties:  Internal Medicine, PEDIATRICS  Why:  Appointment time: 10:00am  Cont UNIT/ML Sopn  Commonly known as:  LEVEMIR  Inject 10 Units into the skin daily. multivitamin Tabs  Take 1 tablet by mouth daily.      Pen Needles 31G X 8 MM Misc  Taken insulin once daily as directed        CHANGE how you take these medications    lisin BIENVENIDO NESBITT Hospitalist  Pager[TN.1]       SW informed that the patient may be ready for d/c today to home with [facility].   [facility] referrals pursued:     [facility] is reviewing the referral and looking into staffing.      2pm:  [facility] will accept and have an R

## 2017-08-01 ENCOUNTER — OFFICE VISIT (OUTPATIENT)
Dept: CARDIOLOGY CLINIC | Facility: HOSPITAL | Age: 57
End: 2017-08-01
Attending: INTERNAL MEDICINE
Payer: MEDICAID

## 2017-08-01 ENCOUNTER — HOSPITAL ENCOUNTER (OUTPATIENT)
Dept: GENERAL RADIOLOGY | Facility: HOSPITAL | Age: 57
Discharge: HOME | End: 2017-08-01
Attending: NURSE PRACTITIONER
Payer: MEDICAID

## 2017-08-01 VITALS
BODY MASS INDEX: 26 KG/M2 | HEART RATE: 73 BPM | WEIGHT: 148 LBS | SYSTOLIC BLOOD PRESSURE: 117 MMHG | DIASTOLIC BLOOD PRESSURE: 61 MMHG

## 2017-08-01 DIAGNOSIS — D72.829 LEUKOCYTOSIS, UNSPECIFIED TYPE: ICD-10-CM

## 2017-08-01 DIAGNOSIS — N39.0 UTI (URINARY TRACT INFECTION): ICD-10-CM

## 2017-08-01 DIAGNOSIS — I50.9 HEART FAILURE, UNSPECIFIED (HCC): Primary | ICD-10-CM

## 2017-08-01 LAB
ANION GAP SERPL CALC-SCNC: 13 MMOL/L (ref 0–18)
BASOPHILS # BLD: 0.1 K/UL (ref 0–0.2)
BASOPHILS NFR BLD: 1 %
BILIRUB UR QL: NEGATIVE
BNP SERPL-MCNC: 110 PG/ML (ref 0–100)
BUN SERPL-MCNC: 33 MG/DL (ref 8–20)
BUN/CREAT SERPL: 18 (ref 10–20)
CALCIUM SERPL-MCNC: 9.7 MG/DL (ref 8.5–10.5)
CHLORIDE SERPL-SCNC: 101 MMOL/L (ref 95–110)
CO2 SERPL-SCNC: 21 MMOL/L (ref 22–32)
COLOR UR: YELLOW
CREAT SERPL-MCNC: 1.83 MG/DL (ref 0.5–1.5)
EOSINOPHIL # BLD: 0.4 K/UL (ref 0–0.7)
EOSINOPHIL NFR BLD: 3 %
ERYTHROCYTE [DISTWIDTH] IN BLOOD BY AUTOMATED COUNT: 14.7 % (ref 11–15)
GLUCOSE SERPL-MCNC: 174 MG/DL (ref 70–99)
GLUCOSE UR-MCNC: NEGATIVE MG/DL
HCT VFR BLD AUTO: 30.5 % (ref 35–48)
HGB BLD-MCNC: 10.2 G/DL (ref 12–16)
HGB UR QL STRIP.AUTO: NEGATIVE
HYALINE CASTS #/AREA URNS AUTO: 1 /LPF
KETONES UR-MCNC: NEGATIVE MG/DL
LYMPHOCYTES # BLD: 1.6 K/UL (ref 1–4)
LYMPHOCYTES NFR BLD: 12 %
MCH RBC QN AUTO: 31.5 PG (ref 27–32)
MCHC RBC AUTO-ENTMCNC: 33.4 G/DL (ref 32–37)
MCV RBC AUTO: 94.4 FL (ref 80–100)
MONOCYTES # BLD: 0.8 K/UL (ref 0–1)
MONOCYTES NFR BLD: 6 %
NEUTROPHILS # BLD AUTO: 10.8 K/UL (ref 1.8–7.7)
NEUTROPHILS NFR BLD: 79 %
NITRITE UR QL STRIP.AUTO: NEGATIVE
OSMOLALITY UR CALC.SUM OF ELEC: 291 MOSM/KG (ref 275–295)
PH UR: 5 [PH] (ref 5–8)
PLATELET # BLD AUTO: 542 K/UL (ref 140–400)
PMV BLD AUTO: 7.3 FL (ref 7.4–10.3)
POTASSIUM SERPL-SCNC: 5.3 MMOL/L (ref 3.3–5.1)
PROT UR-MCNC: 100 MG/DL
RBC # BLD AUTO: 3.23 M/UL (ref 3.7–5.4)
RBC #/AREA URNS AUTO: 2 /HPF
SODIUM SERPL-SCNC: 135 MMOL/L (ref 136–144)
SP GR UR STRIP: 1.02 (ref 1–1.03)
UROBILINOGEN UR STRIP-ACNC: <2
VIT C UR-MCNC: 40 MG/DL
WBC # BLD AUTO: 13.8 K/UL (ref 4–11)
WBC #/AREA URNS AUTO: 13 /HPF

## 2017-08-01 PROCEDURE — 71020 XR CHEST PA + LAT CHEST (CPT=71020): CPT | Performed by: NURSE PRACTITIONER

## 2017-08-01 PROCEDURE — 87086 URINE CULTURE/COLONY COUNT: CPT | Performed by: NURSE PRACTITIONER

## 2017-08-01 PROCEDURE — 36415 COLL VENOUS BLD VENIPUNCTURE: CPT | Performed by: NURSE PRACTITIONER

## 2017-08-01 PROCEDURE — 83880 ASSAY OF NATRIURETIC PEPTIDE: CPT | Performed by: NURSE PRACTITIONER

## 2017-08-01 PROCEDURE — 80048 BASIC METABOLIC PNL TOTAL CA: CPT | Performed by: NURSE PRACTITIONER

## 2017-08-01 PROCEDURE — 85025 COMPLETE CBC W/AUTO DIFF WBC: CPT | Performed by: NURSE PRACTITIONER

## 2017-08-01 PROCEDURE — 99214 OFFICE O/P EST MOD 30 MIN: CPT | Performed by: NURSE PRACTITIONER

## 2017-08-01 PROCEDURE — 81001 URINALYSIS AUTO W/SCOPE: CPT | Performed by: NURSE PRACTITIONER

## 2017-08-01 PROCEDURE — 99211 OFF/OP EST MAY X REQ PHY/QHP: CPT | Performed by: NURSE PRACTITIONER

## 2017-08-01 NOTE — PROGRESS NOTES
[address] [place] [address] Patient Status:  Outpatient    1960 MRN [id number]   Location MD Dr. Lauren Shore is a 61year old female who presents to clinic for post h paroxysmal nocturnal dyspnea  Gastrointestinal: negative  Hematologic/lymphatic: negative  Musculoskeletal:positive for numbness to left of midline sternal incision     Objective:    Lab Results  Component Value Date/Time   WBC 13.8 (H) 08/01/2017 10:50 AM 30-35%    Heart cath:   7/17/17   CORONARY ARTERIES:   Severe 3 vessel disease The left main bifurcates normally  into the LAD and circumflex. Left main:  Normal.  LAD:  Mid-vessel lesion: There is a 95% stenosis. Left circumflex:  Mid-vessel lesion:  The sent: Negative. CXR done. Results pending. Instructed patient to follow up with PCP this week. If unable to see PCP, instructed to contact our office and we will have her seen on Friday. Repeat labs for both BMP and CBC on Friday.  Follow up with clinic as

## 2017-08-01 NOTE — PATIENT INSTRUCTIONS
Stop Lasix    Hold Lisinopril tomorrow    Decrease Lisinopril to 5 mg daily (take 1/2 tablet of the 10 mg tablet)    Follow up with Dr. Jeffery Pallas this week    Repeat Blood work on Friday    Appointment with Dr. Fernando Leung 8/9/17    Appointment with Dr. Jeffery Pallas 8/

## 2017-08-04 ENCOUNTER — TELEPHONE (OUTPATIENT)
Dept: CARDIOLOGY CLINIC | Facility: HOSPITAL | Age: 57
End: 2017-08-04

## 2017-08-04 ENCOUNTER — LAB ENCOUNTER (OUTPATIENT)
Dept: LAB | Facility: HOSPITAL | Age: 57
End: 2017-08-04
Attending: NURSE PRACTITIONER
Payer: MEDICAID

## 2017-08-04 DIAGNOSIS — I50.9 HEART FAILURE, UNSPECIFIED (HCC): ICD-10-CM

## 2017-08-04 DIAGNOSIS — D72.829 LEUKOCYTOSIS, UNSPECIFIED TYPE: ICD-10-CM

## 2017-08-04 DIAGNOSIS — N28.9 RENAL INSUFFICIENCY: Primary | ICD-10-CM

## 2017-08-04 LAB
ANION GAP SERPL CALC-SCNC: 17 MMOL/L (ref 0–18)
BASOPHILS # BLD: 0.1 K/UL (ref 0–0.2)
BASOPHILS NFR BLD: 1 %
BUN SERPL-MCNC: 31 MG/DL (ref 8–20)
BUN/CREAT SERPL: 17.9 (ref 10–20)
CALCIUM SERPL-MCNC: 9.6 MG/DL (ref 8.5–10.5)
CHLORIDE SERPL-SCNC: 104 MMOL/L (ref 95–110)
CO2 SERPL-SCNC: 17 MMOL/L (ref 22–32)
CREAT SERPL-MCNC: 1.73 MG/DL (ref 0.5–1.5)
EOSINOPHIL # BLD: 0.5 K/UL (ref 0–0.7)
EOSINOPHIL NFR BLD: 5 %
ERYTHROCYTE [DISTWIDTH] IN BLOOD BY AUTOMATED COUNT: 14.7 % (ref 11–15)
GLUCOSE SERPL-MCNC: 98 MG/DL (ref 70–99)
HCT VFR BLD AUTO: 30.9 % (ref 35–48)
HGB BLD-MCNC: 10.1 G/DL (ref 12–16)
LYMPHOCYTES # BLD: 1.3 K/UL (ref 1–4)
LYMPHOCYTES NFR BLD: 14 %
MCH RBC QN AUTO: 31.6 PG (ref 27–32)
MCHC RBC AUTO-ENTMCNC: 32.8 G/DL (ref 32–37)
MCV RBC AUTO: 96.4 FL (ref 80–100)
MONOCYTES # BLD: 0.7 K/UL (ref 0–1)
MONOCYTES NFR BLD: 7 %
NEUTROPHILS # BLD AUTO: 6.9 K/UL (ref 1.8–7.7)
NEUTROPHILS NFR BLD: 73 %
OSMOLALITY UR CALC.SUM OF ELEC: 293 MOSM/KG (ref 275–295)
PLATELET # BLD AUTO: 473 K/UL (ref 140–400)
PMV BLD AUTO: 7.7 FL (ref 7.4–10.3)
POTASSIUM SERPL-SCNC: 4.3 MMOL/L (ref 3.3–5.1)
RBC # BLD AUTO: 3.2 M/UL (ref 3.7–5.4)
SODIUM SERPL-SCNC: 138 MMOL/L (ref 136–144)
WBC # BLD AUTO: 9.4 K/UL (ref 4–11)

## 2017-08-04 PROCEDURE — 80048 BASIC METABOLIC PNL TOTAL CA: CPT

## 2017-08-04 PROCEDURE — 36415 COLL VENOUS BLD VENIPUNCTURE: CPT

## 2017-08-04 PROCEDURE — 85025 COMPLETE CBC W/AUTO DIFF WBC: CPT

## 2017-08-04 NOTE — TELEPHONE ENCOUNTER
Phoned patient with blood result update. Cr remains elevated. Instructed to decrease Metformin to 500 mg twice daily, hold Lisinopril x 2 days then restart Lisinopril 5mg daily.   Repeat blood work Wednesday prior to her appointment with Dr. Hoang Lange

## 2017-08-09 ENCOUNTER — APPOINTMENT (OUTPATIENT)
Dept: LAB | Facility: HOSPITAL | Age: 57
End: 2017-08-09
Attending: NURSE PRACTITIONER
Payer: MEDICAID

## 2017-08-09 DIAGNOSIS — N28.9 RENAL INSUFFICIENCY: ICD-10-CM

## 2017-08-09 LAB
ANION GAP SERPL CALC-SCNC: 14 MMOL/L (ref 0–18)
BUN SERPL-MCNC: 21 MG/DL (ref 8–20)
BUN/CREAT SERPL: 14.1 (ref 10–20)
CALCIUM SERPL-MCNC: 9.7 MG/DL (ref 8.5–10.5)
CHLORIDE SERPL-SCNC: 106 MMOL/L (ref 95–110)
CO2 SERPL-SCNC: 21 MMOL/L (ref 22–32)
CREAT SERPL-MCNC: 1.49 MG/DL (ref 0.5–1.5)
GLUCOSE SERPL-MCNC: 132 MG/DL (ref 70–99)
OSMOLALITY UR CALC.SUM OF ELEC: 297 MOSM/KG (ref 275–295)
POTASSIUM SERPL-SCNC: 4.8 MMOL/L (ref 3.3–5.1)
SODIUM SERPL-SCNC: 141 MMOL/L (ref 136–144)

## 2017-08-09 PROCEDURE — 36415 COLL VENOUS BLD VENIPUNCTURE: CPT

## 2017-08-09 PROCEDURE — 80048 BASIC METABOLIC PNL TOTAL CA: CPT

## 2017-08-21 PROBLEM — I48.0 PAROXYSMAL ATRIAL FIBRILLATION (HCC): Status: ACTIVE | Noted: 2017-08-21

## 2017-09-26 NOTE — ADDENDUM NOTE
Addendum  created 09/26/17 1451 by Haylie Ag MD    Anesthesia Intra Blocks edited, Sign clinical note

## 2017-11-28 ENCOUNTER — LAB ENCOUNTER (OUTPATIENT)
Dept: LAB | Age: 57
End: 2017-11-28
Attending: INTERNAL MEDICINE
Payer: MEDICAID

## 2017-11-28 DIAGNOSIS — I27.20 PROGRESSIVE PULMONARY HYPERTENSION (HCC): ICD-10-CM

## 2017-11-28 DIAGNOSIS — E11.59 DIABETIC VASCULOPATHY (HCC): Primary | ICD-10-CM

## 2017-11-28 DIAGNOSIS — Z12.11 SPECIAL SCREENING FOR MALIGNANT NEOPLASMS, COLON: ICD-10-CM

## 2017-11-28 DIAGNOSIS — I25.118 ATHSCL HEART DISEASE OF NATIVE COR ART W OTH ANG PCTRS (HCC): ICD-10-CM

## 2017-11-28 PROCEDURE — 82043 UR ALBUMIN QUANTITATIVE: CPT | Performed by: INTERNAL MEDICINE

## 2017-11-28 PROCEDURE — 82607 VITAMIN B-12: CPT

## 2017-11-28 PROCEDURE — 82570 ASSAY OF URINE CREATININE: CPT | Performed by: INTERNAL MEDICINE

## 2018-02-21 ENCOUNTER — APPOINTMENT (OUTPATIENT)
Dept: GENERAL RADIOLOGY | Facility: HOSPITAL | Age: 58
End: 2018-02-21
Attending: EMERGENCY MEDICINE
Payer: MEDICAID

## 2018-02-21 ENCOUNTER — HOSPITAL ENCOUNTER (EMERGENCY)
Facility: HOSPITAL | Age: 58
Discharge: HOME | End: 2018-02-21
Attending: EMERGENCY MEDICINE
Payer: MEDICAID

## 2018-02-21 VITALS
RESPIRATION RATE: 16 BRPM | BODY MASS INDEX: 25.61 KG/M2 | WEIGHT: 150 LBS | SYSTOLIC BLOOD PRESSURE: 187 MMHG | DIASTOLIC BLOOD PRESSURE: 86 MMHG | HEIGHT: 64 IN | HEART RATE: 71 BPM | OXYGEN SATURATION: 100 % | TEMPERATURE: 98 F

## 2018-02-21 DIAGNOSIS — M71.50 BURSITIS DUE TO TRAUMA: Primary | ICD-10-CM

## 2018-02-21 PROCEDURE — 99283 EMERGENCY DEPT VISIT LOW MDM: CPT

## 2018-02-21 PROCEDURE — 73080 X-RAY EXAM OF ELBOW: CPT | Performed by: EMERGENCY MEDICINE

## 2018-02-21 NOTE — ED INITIAL ASSESSMENT (HPI)
Pt report right arm pain s/p hitting it on a door since yest. Pt states that she is now not able to move the ext. Pt denies numbness or tingling.  Cms intact to ext

## 2018-02-21 NOTE — ED PROVIDER NOTES
Patient Seen in: [facility] AND [facility] Emergency Department    History   Patient presents with:  Upper Extremity Injury (musculoskeletal)    Stated Complaint:     HPI    60year old female complains of right elbow pain starting after bumping her elbow on a doo Glucose Blood (ONETOUCH VERIO) In Vitro Strip,  Check sugars once daily   aspirin 81 MG Oral Tab EC,  Take 1 tablet (81 mg total) by mouth daily. Metoprolol Succinate ER 25 MG Oral Tablet 24 Hr,  Take 1 tablet (25 mg total) by mouth daily.    insulin det Head: Normocephalic and atraumatic. Head is without raccoon's eyes, without right periorbital erythema and without left periorbital erythema. Nose: Nose normal.   Mouth/Throat: Mucous membranes are normal. Mucous membranes are not pale and not cyanotic. adjacent to the distal medial humeral condyle. EFFUSION: None visible. OTHER: Negative.            Dictated by (CST): Claudia Spencer MD on 2/21/2018 at 13:14         Approved by (CST): Claudia Spencer MD on 2/21/2018 at 13:16              ===== Bibi Gary [address]  [phone]    Schedule an appointment as soon as possible for a visit        Medications Prescribed:  Current Discharge Medication List            Anticipatory guidance, discharge instructions, disease/injury specific precautions, retur

## 2018-03-28 PROBLEM — IMO0001 MILD AORTIC SCLEROSIS: Status: ACTIVE | Noted: 2017-06-28

## 2018-03-28 PROBLEM — I77.9 CAROTID DISEASE, BILATERAL (HCC): Status: ACTIVE | Noted: 2017-07-17

## 2018-05-07 ENCOUNTER — APPOINTMENT (OUTPATIENT)
Dept: GENERAL RADIOLOGY | Facility: HOSPITAL | Age: 58
End: 2018-05-07
Attending: EMERGENCY MEDICINE
Payer: MEDICAID

## 2018-05-07 ENCOUNTER — HOSPITAL ENCOUNTER (EMERGENCY)
Facility: HOSPITAL | Age: 58
Discharge: HOME | End: 2018-05-07
Attending: EMERGENCY MEDICINE
Payer: MEDICAID

## 2018-05-07 VITALS
RESPIRATION RATE: 18 BRPM | TEMPERATURE: 98 F | WEIGHT: 155 LBS | OXYGEN SATURATION: 95 % | SYSTOLIC BLOOD PRESSURE: 166 MMHG | HEART RATE: 60 BPM | HEIGHT: 63 IN | BODY MASS INDEX: 27.46 KG/M2 | DIASTOLIC BLOOD PRESSURE: 75 MMHG

## 2018-05-07 DIAGNOSIS — L03.115 CELLULITIS OF RIGHT FOOT: Primary | ICD-10-CM

## 2018-05-07 PROCEDURE — 96365 THER/PROPH/DIAG IV INF INIT: CPT

## 2018-05-07 PROCEDURE — 86140 C-REACTIVE PROTEIN: CPT | Performed by: EMERGENCY MEDICINE

## 2018-05-07 PROCEDURE — 85025 COMPLETE CBC W/AUTO DIFF WBC: CPT | Performed by: EMERGENCY MEDICINE

## 2018-05-07 PROCEDURE — 73630 X-RAY EXAM OF FOOT: CPT | Performed by: EMERGENCY MEDICINE

## 2018-05-07 PROCEDURE — 99284 EMERGENCY DEPT VISIT MOD MDM: CPT

## 2018-05-07 PROCEDURE — 80048 BASIC METABOLIC PNL TOTAL CA: CPT | Performed by: EMERGENCY MEDICINE

## 2018-05-07 PROCEDURE — 85652 RBC SED RATE AUTOMATED: CPT | Performed by: EMERGENCY MEDICINE

## 2018-05-07 RX ORDER — CEPHALEXIN 500 MG/1
500 CAPSULE ORAL 3 TIMES DAILY
Qty: 21 CAPSULE | Refills: 0 | Status: SHIPPED | OUTPATIENT
Start: 2018-05-07 | End: 2018-05-14

## 2018-05-08 NOTE — ED NOTES
Patient is in stable condition. Provided discharge instructions and follow-up information with understanding verbalized.

## 2018-05-08 NOTE — ED PROVIDER NOTES
Patient Seen in: [facility] AND [facility] Emergency Department    History   Patient presents with:  Cellulitis (integumentary, infectious)    Stated Complaint: swelling to right foot    HPI    Patient is a 50-year-old female with a history of congenital clubfee src: Oral  SpO2: 96 %  O2 Device: None (Room air)    Current:BP (!) 178/88   Pulse 78   Temp 98.3 °F (36.8 °C) (Oral)   Resp 18   Ht 160 cm (5' 3\")   Wt 70.3 kg   SpO2 97%   BMI 27.46 kg/m²         Physical Exam    Constitutional: Oriented to person, plac components within normal limits   C-REACTIVE PROTEIN - Abnormal; Notable for the following:     C-Reactive Protein 3.2 (*)     All other components within normal limits   CBC WITH DIFFERENTIAL WITH PLATELET    Narrative:      The following orders were creat

## 2018-06-06 ENCOUNTER — APPOINTMENT (OUTPATIENT)
Dept: MRI IMAGING | Facility: HOSPITAL | Age: 58
DRG: 638 | End: 2018-06-06
Attending: HOSPITALIST
Payer: MEDICAID

## 2018-06-06 ENCOUNTER — APPOINTMENT (OUTPATIENT)
Dept: GENERAL RADIOLOGY | Facility: HOSPITAL | Age: 58
DRG: 638 | End: 2018-06-06
Attending: NURSE PRACTITIONER
Payer: MEDICAID

## 2018-06-06 ENCOUNTER — HOSPITAL ENCOUNTER (INPATIENT)
Facility: HOSPITAL | Age: 58
LOS: 2 days | Discharge: HOME | DRG: 638 | End: 2018-06-08
Attending: HOSPITALIST | Admitting: HOSPITALIST
Payer: MEDICAID

## 2018-06-06 DIAGNOSIS — M86.9 OSTEOMYELITIS OF RIGHT ANKLE, UNSPECIFIED TYPE (HCC): Primary | ICD-10-CM

## 2018-06-06 PROCEDURE — A4216 STERILE WATER/SALINE, 10 ML: HCPCS | Performed by: HOSPITALIST

## 2018-06-06 PROCEDURE — 87641 MR-STAPH DNA AMP PROBE: CPT | Performed by: INTERNAL MEDICINE

## 2018-06-06 PROCEDURE — 73610 X-RAY EXAM OF ANKLE: CPT | Performed by: NURSE PRACTITIONER

## 2018-06-06 PROCEDURE — 86140 C-REACTIVE PROTEIN: CPT | Performed by: HOSPITALIST

## 2018-06-06 PROCEDURE — 96365 THER/PROPH/DIAG IV INF INIT: CPT

## 2018-06-06 PROCEDURE — 36415 COLL VENOUS BLD VENIPUNCTURE: CPT

## 2018-06-06 PROCEDURE — 87040 BLOOD CULTURE FOR BACTERIA: CPT | Performed by: NURSE PRACTITIONER

## 2018-06-06 PROCEDURE — 73723 MRI JOINT LWR EXTR W/O&W/DYE: CPT | Performed by: HOSPITALIST

## 2018-06-06 PROCEDURE — 85652 RBC SED RATE AUTOMATED: CPT | Performed by: HOSPITALIST

## 2018-06-06 PROCEDURE — 80048 BASIC METABOLIC PNL TOTAL CA: CPT | Performed by: NURSE PRACTITIONER

## 2018-06-06 PROCEDURE — 99285 EMERGENCY DEPT VISIT HI MDM: CPT

## 2018-06-06 PROCEDURE — 82962 GLUCOSE BLOOD TEST: CPT

## 2018-06-06 PROCEDURE — 73720 MRI LWR EXTREMITY W/O&W/DYE: CPT | Performed by: HOSPITALIST

## 2018-06-06 PROCEDURE — 85025 COMPLETE CBC W/AUTO DIFF WBC: CPT | Performed by: NURSE PRACTITIONER

## 2018-06-06 PROCEDURE — A9576 INJ PROHANCE MULTIPACK: HCPCS | Performed by: HOSPITALIST

## 2018-06-06 PROCEDURE — 83036 HEMOGLOBIN GLYCOSYLATED A1C: CPT | Performed by: HOSPITALIST

## 2018-06-06 RX ORDER — SODIUM CHLORIDE 0.9 % (FLUSH) 0.9 %
3 SYRINGE (ML) INJECTION AS NEEDED
Status: DISCONTINUED | OUTPATIENT
Start: 2018-06-06 | End: 2018-06-08

## 2018-06-06 RX ORDER — LEVOTHYROXINE SODIUM 0.05 MG/1
50 TABLET ORAL
Status: DISCONTINUED | OUTPATIENT
Start: 2018-06-07 | End: 2018-06-08

## 2018-06-06 RX ORDER — DEXTROSE MONOHYDRATE 25 G/50ML
50 INJECTION, SOLUTION INTRAVENOUS AS NEEDED
Status: DISCONTINUED | OUTPATIENT
Start: 2018-06-06 | End: 2018-06-08

## 2018-06-06 RX ORDER — METOPROLOL SUCCINATE 25 MG/1
25 TABLET, EXTENDED RELEASE ORAL
Status: DISCONTINUED | OUTPATIENT
Start: 2018-06-07 | End: 2018-06-08

## 2018-06-06 RX ORDER — ACETAMINOPHEN 325 MG/1
650 TABLET ORAL EVERY 4 HOURS PRN
Status: DISCONTINUED | OUTPATIENT
Start: 2018-06-06 | End: 2018-06-08

## 2018-06-06 RX ORDER — CLOPIDOGREL BISULFATE 75 MG/1
75 TABLET ORAL DAILY
Status: DISCONTINUED | OUTPATIENT
Start: 2018-06-07 | End: 2018-06-07

## 2018-06-06 RX ORDER — HYDROCODONE BITARTRATE AND ACETAMINOPHEN 5; 325 MG/1; MG/1
2 TABLET ORAL EVERY 4 HOURS PRN
Status: DISCONTINUED | OUTPATIENT
Start: 2018-06-06 | End: 2018-06-08

## 2018-06-06 RX ORDER — LISINOPRIL 40 MG/1
40 TABLET ORAL DAILY
Status: DISCONTINUED | OUTPATIENT
Start: 2018-06-07 | End: 2018-06-08

## 2018-06-06 RX ORDER — HEPARIN SODIUM 5000 [USP'U]/ML
5000 INJECTION, SOLUTION INTRAVENOUS; SUBCUTANEOUS EVERY 8 HOURS SCHEDULED
Status: DISCONTINUED | OUTPATIENT
Start: 2018-06-06 | End: 2018-06-08

## 2018-06-06 RX ORDER — ATORVASTATIN CALCIUM 40 MG/1
40 TABLET, FILM COATED ORAL NIGHTLY
Status: DISCONTINUED | OUTPATIENT
Start: 2018-06-06 | End: 2018-06-08

## 2018-06-06 RX ORDER — HYDROCODONE BITARTRATE AND ACETAMINOPHEN 5; 325 MG/1; MG/1
1 TABLET ORAL EVERY 4 HOURS PRN
Status: DISCONTINUED | OUTPATIENT
Start: 2018-06-06 | End: 2018-06-08

## 2018-06-06 RX ORDER — METOCLOPRAMIDE HYDROCHLORIDE 5 MG/ML
10 INJECTION INTRAMUSCULAR; INTRAVENOUS EVERY 8 HOURS PRN
Status: DISCONTINUED | OUTPATIENT
Start: 2018-06-06 | End: 2018-06-08

## 2018-06-06 RX ORDER — ONDANSETRON 2 MG/ML
4 INJECTION INTRAMUSCULAR; INTRAVENOUS EVERY 6 HOURS PRN
Status: DISCONTINUED | OUTPATIENT
Start: 2018-06-06 | End: 2018-06-08

## 2018-06-06 RX ORDER — ASPIRIN 325 MG
325 TABLET, DELAYED RELEASE (ENTERIC COATED) ORAL DAILY
Status: DISCONTINUED | OUTPATIENT
Start: 2018-06-07 | End: 2018-06-08

## 2018-06-06 NOTE — ED NOTES
Transport here, pt changed into hospital gown. Up to restroom, no urine sample warranted per RAH Velazquez.

## 2018-06-06 NOTE — ED NOTES
Pt states that she is having atraumatic right foot and ankle pain. She has had osteomylitis and cellulitis in this foot in the past and is concerned that there may be an infection present now.   The area does not appear to be red or swollen, but patient fe

## 2018-06-06 NOTE — ED PROVIDER NOTES
Patient Seen in: [facility] AND CLINICS Emergency Department    History   CC: ankle pain  HPI: Jeanette Vale 60year old female w/hx of previous right foot osteomyelitis and partial amputation, DMII, neuropathy, HTN, hyperlipidemia, congential deformity of th Packs/day: 1.00      Years: 14.00        Types: Cigarettes     Quit date: 1/21/1999  Smokeless tobacco: Never Used                      Alcohol use:  No                ROS:  Review of Systems    Positive for stated complaint: ankle pain  Other SpO2 100%   BMI 26.61 kg/m²         General - Appears well, non-toxic and in NAD  Head - Appears symmetrical without deformity/swelling cranium, scalp, or facial bones  Resp - Lung sounds clear bilaterally and wob unlabored, good aeration with equal, even Abnormality         Status                     ---------                               -----------         ------                     CBC W/ DIFFERENTIAL[[id number]]                              Final result                 Please view results for the Stephon Schilling, podiatry. 1450: Spoke to Dr. Juan David Hess, ID.       Disposition and Plan     Clinical Impression:  Osteomyelitis of right ankle, unspecified type ([facility]  (primary encounter diagnosis)    Disposition:  Admit    Follow-up:  No follow-up provider specified

## 2018-06-06 NOTE — PROGRESS NOTES
[facility] dosing service    Initial Pharmacokinetic Consult for Vancomycin Dosing     Christofer Tomas is a 60year old female admitted on 6/6/18 who is being treated for cellulitis. Pharmacy has been asked to dose Vancomycin by Dr. Efra Garcia.     She has No K

## 2018-06-06 NOTE — H&P
[facility] Hospitalist History and Physical      Patient presents with:  Swelling Edema (cardiovascular, metabolic)       PCP: Gus Mathews MD      History of Present Illness: Patient is a 60year old female with PMH sig for congenital clubfoot, CAD, Ischemic tobacco: Never Used    Alcohol use No        Fam Hx  Family History   Problem Relation Age of Onset   • Heart Disorder Father 61     MI tob   • Hypertension Father    • Lipids Father    • Heart Disorder Mother 80     cabg    • hypothyroid [OTHER] Daughter amputation at the level of the metatarsal bases of the first through fifth metatarsals. Minimal erosive bony changes involving the resection site at the base of the first metatarsal and fifth metatarsal has developed since 5/7/18.  Acute osteomyelitis suspe for congenital clubfoot, CAD, Ischemic cardiomyopathy (EF 30-35% on TTE 6/2017), DM, HTN, HL who presented to [place] ED for pain, redness and swelling of R ankle and right foot    R ankle/foot pain, erythema  -Painful dorsiflexion on exam  -R ankle XR wi

## 2018-06-06 NOTE — CONSULTS
[facility] AND [facility]  [address] [facility] A Sergio Patient Status:  Emergency    1960 MRN [id number]   Location [address] Attending No att. providers found   Hosp Day # 0 PCP Rita Gonsalez MD     Reas FOOT,TRANSMETATARSAL  No date:       Comment: premie at 27 week; poor weight gain  2017: CABG  No date: CARPAL TUNNEL RELEASE  No date:       Comment: x1 ; first pregnancy  No date: REPLACE AORTIC VALVE W/BYP  Family History   Problem Re easy bruising, bleeding, and lymphadenopathy. Musculoskeletal: Negative for myalgias, arthralgias, muscle weakness.   Neurological: Negative for headaches, dizziness, seizures, memory problems, trouble swallowing, speech problems, gait problems and weaknes metatarsals). Cortical erosions have developed in the resected margins of the first and fifth metatarsal bases.    Suspicious for Acute Osteomyelitis       Cultures:  Reviewed     Assessment and Plan:  Patient Active Problem List:     NSTEMI (non-ST elevat

## 2018-06-07 PROCEDURE — 85025 COMPLETE CBC W/AUTO DIFF WBC: CPT | Performed by: HOSPITALIST

## 2018-06-07 PROCEDURE — 83036 HEMOGLOBIN GLYCOSYLATED A1C: CPT | Performed by: HOSPITALIST

## 2018-06-07 PROCEDURE — 97162 PT EVAL MOD COMPLEX 30 MIN: CPT

## 2018-06-07 PROCEDURE — A4216 STERILE WATER/SALINE, 10 ML: HCPCS | Performed by: HOSPITALIST

## 2018-06-07 PROCEDURE — 82962 GLUCOSE BLOOD TEST: CPT

## 2018-06-07 PROCEDURE — 80048 BASIC METABOLIC PNL TOTAL CA: CPT | Performed by: HOSPITALIST

## 2018-06-07 NOTE — PROGRESS NOTES
[facility] Hospitalist Progress Note     PCP: Binu Kinsey MD    Chief Complaint: follow-up      SUBJECTIVE:  Pt reports lateral ankle pain- pressure type    OBJECTIVE:  Temp:  [97.2 °F (36.2 °C)-98.9 °F (37.2 °C)] 98.4 °F (36.9 °C)  Pulse:  [67-80] 70  Res 40 mg Oral Daily   • Metoprolol Succinate ER  25 mg Oral Daily Beta Blocker       Normal Saline Flush, acetaminophen **OR** HYDROcodone-acetaminophen **OR** HYDROcodone-acetaminophen, ondansetron HCl, Metoclopramide HCl, dextrose, Glucose-Vitamin C, gluco management in the interim boot/ brace however cellulitis might preclude this  - podiatry consulted rec arterial dopplers to assess flow  - ESR/ CRP 42/ 4.5    DM  - SSI PRN  - hold PO meds  - HgA1c 7.1    CAD, CHF/ ICM 30-35%, HTN, HL  - cont home meds, sh

## 2018-06-07 NOTE — PLAN OF CARE
Problem: Patient Centered Care  Goal: Patient preferences are identified and integrated in the patient's plan of care  Interventions:  - What would you like us to know as we care for you?   - Provide timely, complete, and accurate information to patient/fa Principal Discharge DX:	Constipation

## 2018-06-07 NOTE — PROGRESS NOTES
[facility] AND [facility] Infectious Disease Progress Note    José Phipps Patient Status:  Inpatient    1960 MRN [id number]   Location [facility] 5SW/SE Attending Renea Lefort, MD   Hosp Day # 1 PCP Greg Joaquin MD     Subjective:   Pt w (1.626 m), weight 155 lb (70.3 kg), SpO2 98 %. Temp (24hrs), Av.3 °F (36.8 °C), Min:97.2 °F (36.2 °C), Max:98.9 °F (37.2 °C)      HEENT: Exam is unremarkable. Without scleral icterus. Mucous membranes are moist. PERRLA. Oropharynx is clear.   Neck:

## 2018-06-07 NOTE — OCCUPATIONAL THERAPY NOTE
OCCUPATIONAL THERAPY EVALUATION - INPATIENT      Room Number: 562/562-A  Evaluation Date: 6/7/2018  Type of Evaluation: Initial  Presenting Problem: r ankle swelling/pain    Physician Order: IP Consult to Occupational Therapy  Reason for Therapy: ADL/IADL 2/27/2017   • Clubfoot, congenital     right   • Congenital hand deformity    • Congestive heart disease ([facility]    • Diabetes ([facility]    • Essential hypertension    • Foot osteomyelitis, right ([facility] 2/4/14    amputation partial of right foot   • Heart attack the patient currently need…  -   Putting on and taking off regular lower body clothing?: A Little  -   Bathing (including washing, rinsing, drying)?: None  -   Toileting, which includes using toilet, bedpan or urinal? : None  -   Putting on and taking off

## 2018-06-07 NOTE — PHYSICAL THERAPY NOTE
PHYSICAL THERAPY EVALUATION - INPATIENT     Room Number: 562/562-A  Evaluation Date: 6/7/2018  Type of Evaluation: Initial   Physician Order: PT Eval and Treat    Presenting Problem: R ankle and foot osteomyelitis  Reason for Therapy: Mobility Dysfunction Recommendations: Home (lives with parents)    PLAN  PT Treatment Plan: Energy conservation;Gait training;Strengthening;Stair training;Transfer training;Balance training  Rehab Potential : Good  Frequency (Obs): 5x/week       PHYSICAL THERAPY MEDICAL/SOCIAL OBJECTIVE  Precautions:  (R foot peripheral neuropathy no protective sensation)  Fall Risk: Standard fall risk    WEIGHT BEARING RESTRICTION  Weight Bearing Restriction: R lower extremity        R Lower Extremity: Non-Weight Bearing (until clarificatio RW)  Stoop/Curb Assistance: Not tested  Comment : RN requested weight bearing clarification from MD (MRI showed tendon damage R foot)    Bed Mobility: indep    Transfers: mod indep with RW    Exercise/Education Provided:  Bed mobility  Energy conservation

## 2018-06-07 NOTE — CONSULTS
[facility]  Report of Consultation    Linh Peter Patient Status:  Inpatient    1960 MRN [id number]   Location [facility] 5SW/SE Attending Catracho Floyd MD   Hosp Day # 1 PCP Gosia Mazariegos MD     SUBJECTIVE:    Reason for Consult Thyroid disease      Past Surgical History:  No date: AMPUTATION FOOT,TRANSMETATARSAL  No date:       Comment: premie at 30 week; poor weight gain  2017: CABG  No date: CARPAL TUNNEL RELEASE  No date:       Comment: x1 ; first pregnancy CBC W/ DIFFERENTIAL   Order: [id number] - Part of Panel Order [id number]   Collected:  6/7/2018 05:54 Status:  Final result    Ref Range & Units 6/7/18 0554   WBC 4.0 - 11.0 K/UL 7.1    RBC 3.70 - 5.40 M/UL 3.97    HGB 12.0 - 16.0 g/dL 12.3    HCT 35.0 - 48.

## 2018-06-07 NOTE — PLAN OF CARE
Problem: Patient Centered Care  Goal: Patient preferences are identified and integrated in the patient's plan of care  Interventions:  - What would you like us to know as we care for you?  Give me updates on plan of care   - Provide timely, complete, and ac

## 2018-06-08 ENCOUNTER — APPOINTMENT (OUTPATIENT)
Dept: ULTRASOUND IMAGING | Facility: HOSPITAL | Age: 58
DRG: 638 | End: 2018-06-08
Attending: HOSPITALIST
Payer: MEDICAID

## 2018-06-08 VITALS
BODY MASS INDEX: 26.46 KG/M2 | TEMPERATURE: 98 F | RESPIRATION RATE: 16 BRPM | WEIGHT: 155 LBS | OXYGEN SATURATION: 98 % | HEIGHT: 64 IN | DIASTOLIC BLOOD PRESSURE: 75 MMHG | HEART RATE: 68 BPM | SYSTOLIC BLOOD PRESSURE: 142 MMHG

## 2018-06-08 PROCEDURE — 80202 ASSAY OF VANCOMYCIN: CPT | Performed by: INTERNAL MEDICINE

## 2018-06-08 PROCEDURE — 85025 COMPLETE CBC W/AUTO DIFF WBC: CPT | Performed by: HOSPITALIST

## 2018-06-08 PROCEDURE — A4216 STERILE WATER/SALINE, 10 ML: HCPCS | Performed by: HOSPITALIST

## 2018-06-08 PROCEDURE — 80048 BASIC METABOLIC PNL TOTAL CA: CPT | Performed by: HOSPITALIST

## 2018-06-08 PROCEDURE — 82962 GLUCOSE BLOOD TEST: CPT

## 2018-06-08 RX ORDER — DOXYCYCLINE HYCLATE 100 MG
100 TABLET ORAL 2 TIMES DAILY
Qty: 28 TABLET | Refills: 0 | Status: SHIPPED | OUTPATIENT
Start: 2018-06-08 | End: 2018-06-22

## 2018-06-08 RX ORDER — CEFUROXIME AXETIL 500 MG/1
500 TABLET ORAL 2 TIMES DAILY
Qty: 20 TABLET | Refills: 0 | Status: SHIPPED | OUTPATIENT
Start: 2018-06-08 | End: 2018-06-08

## 2018-06-08 RX ORDER — HYDROCODONE BITARTRATE AND ACETAMINOPHEN 5; 325 MG/1; MG/1
1 TABLET ORAL EVERY 4 HOURS PRN
Qty: 30 TABLET | Refills: 0 | Status: ON HOLD | OUTPATIENT
Start: 2018-06-08 | End: 2019-12-12

## 2018-06-08 NOTE — PAYOR COMM NOTE
--------------  ADMISSION REVIEW     Payor: Amos Maldonado #:  [id number]  Authorization Number: [id number]    Admit date: 6/6/18  Admit time: 8784         Patient Seen in: [facility] Emergency Department    Hi Oral Tab,  TAKE ONE TABLET BY MOUTH TWO TIMES A DAY WITH A MEAL   LEVOTHYROXINE SODIUM 50 MCG Oral Tab,  TAKE ONE TABLET BY MOUTH IN THE MORNING BEFORE BREAKFAST   lisinopril 40 MG Oral Tab,  Take 1 tablet (40 mg total) by mouth daily.    ATORVASTATIN 40 MG extremities, foot press strength equal bilat  + tenderness to right lateral ankle diffuse. No tenderness is elicited with palpation to the right medial ankle, foot, shin, calf or knee of the right lower extremity.   No tenderness elicited with palpation di Suspicious for Acute Osteomyelitis            Dictated by (CST): Alen Schroeder MD on 6/06/2018 at 13:49       Approved by (CST): Alen Schroeder MD on 6/06/2018 at 13:54       Results discussed with patient as well as Dr. Magda Nath who also evaluated partial of right foot   • Heart attack ([facility]    • High blood pressure    • High cholesterol    • Hyperlipidemia    • Ischemic cardiomyopathy 2/27/2017   • Neuropathy    • Thyroid disease       Past Surgical History:  No date: AMPUTATION FOOT,TRANSMETATARSA 5/07/2018, 20:07. INDICATIONS: Pain and swelling in right ankle x2 days. No known injury. TECHNIQUE:  3 views were obtained. FINDINGS:  BONES: Forefoot amputation at the level of the metatarsal bases of the first through fifth metatarsals.  Minimal eros MD on 5/07/2018 at 20:42     Approved by (CST):  Donovan Wells MD on 5/07/2018 at 20:44             Assessment/Plan:     60year old female with PMH sig for congenital clubfoot, CAD, Ischemic cardiomyopathy (EF 30-35% on TTE 6/2017), DM, HTN, HL who pre 1052 Given 3 mL Intravenous     6/7/2018 1453 Given 3 mL Intravenous       Vancomycin HCl (VANCOCIN) 1,000 mg in sodium chloride 0.9 % 250 mL IVPB add-vantage     Date Action Dose Route     6/7/2018 1446 New Bag 1000 mg Intravenous       Vancomycin HCl (VA

## 2018-06-08 NOTE — PROGRESS NOTES
Patient is ready for discharge for ID and primary care. Patient given all follow up information and discharge instructions. Patient understands all information provided. Patient understands need for follow ups. norco prescription provided to patient.  All q

## 2018-06-08 NOTE — PROGRESS NOTES
[facility] dosing service    Follow-up Pharmacokinetic Consult for Vancomycin Dosing     Josesito Casper is a 60year old female admitted on 6/6 who is being treated for cellulitis. Patient is on day 3 of Vancomycin 1 gm IV Q 12 hours.   Goal trough is 10 Pharmacy Extension: [phone]

## 2018-06-08 NOTE — PROGRESS NOTES
[facility]                                                            Progress Note    Linh Peter Patient Status:  Inpatient    1960 MRN [id number]   Location [facility] 5SW/SE Attending Edel Krishnan MD   [facility] Day # 2 PCP Marisela Barragan MG per tab 2 tablet, 2 tablet, Oral, Q4H PRN  •  ondansetron HCl (ZOFRAN) injection 4 mg, 4 mg, Intravenous, Q6H PRN  •  Metoclopramide HCl (REGLAN) injection 10 mg, 10 mg, Intravenous, Q8H PRN  •  dextrose 50 % injection 50 mL, 50 mL, Intravenous, PRN  • Pulmonary HTN ([facility]     Controlled type 2 diabetes mellitus with circulatory disorder, without long-term current use of insulin ([facility]     Dyslipidemia associated with type 2 diabetes mellitus ([facility]     Coronary artery disease of native artery of native hea

## 2018-06-08 NOTE — DISCHARGE SUMMARY
[facility] Internal Medicine Discharge Summary   Patient ID:  Christofer Tomas  [id number]  54 year old  1/12/1960    Admit date: 6/6/2018    Discharge date and time: 6/8/2018     Attending Physician: Ronald Plunkett MD     Primary Care Physician: Heidi Rodrigues for pain fm the tendon tear/ tendonitis can see [facility] foot ankle specialist Irish Alcazar outpatient to eval re fusion.  nonsurg management in the interim boot/ brace however cellulitis might preclude this  - podiatry consulted rec arterial dopplers to assess flow MetFORMIN HCl 1000 MG Tabs  Commonly known as:  GLUCOPHAGE  TAKE ONE TABLET BY MOUTH TWO TIMES A DAY WITH A MEAL     Metoprolol Succinate ER 25 MG Tb24  Commonly known as: Toprol XL  Take 1 tablet (25 mg total) by mouth daily.      [facility]CH [facility] Suspicious for Acute Osteomyelitis    Dictated by (CST): Clarence Osullivan MD on 6/06/2018 at 13:49     Approved by (CST): Clarence Osullivan MD on 6/06/2018 at 13:54          Mri Foot+ankle, Right (all W+wo) (cpt=73720/67805)    Result Date: 6/7/2018 extensor digitorum tendon. No full-thickness or retracted tear. ACHILLES:  Intact. No surrounding abnormality. PLANTAR FASCIA: No tear or surrounding soft tissue edema to suggest fasciitis.   SINUS TARSI:  No edema or synovitis to suggest sinus tarsi syn metatarsal stump. CONCLUSION:   2.6 cm soft tissue mass along the plantar aspect of the distal first metatarsal stump is indeterminate but may represent postoperative granulation tissue versus phlegmon. No defined abscess.   Cortical irregularity and e

## 2018-06-08 NOTE — PROGRESS NOTES
[facility] AND [facility] Infectious Disease Progress Note    Evelyn West Patient Status:  Inpatient    1960 MRN [id number]   Location [facility] 5SW/SE Attending Gisel Juares MD   Hosp Day # 2 PCP Miriam Calles MD     Subjective:   Pt w 18, height 5' 4\" (1.626 m), weight 155 lb (70.3 kg), SpO2 95 %. Temp (24hrs), Av.3 °F (36.8 °C), Min:97.2 °F (36.2 °C), Max:98.9 °F (37.2 °C)      HEENT: Exam is unremarkable. Without scleral icterus. Mucous membranes are moist. TRENT Shaw or concerns please call G-ID at [phone].      VELMA ALVARES NP  6/7/2018  10:42 AM

## 2018-06-21 NOTE — PAYOR COMM NOTE
--------------  DISCHARGE REVIEW    Payor: Amos Maldonado #:  [id number]  Authorization Number: [id number]    Admit date: 6/6/18  Admit time:  8432  Discharge Date: 6/8/2018  2:03 PM     Admitting Physician: Keya Manley Radha Spain DO  [facility] [address] [phone]    Schedule an appointment as soon as possible for a visit in 2 weeks        Please refer to prior H&P by Dr Cassandra Ariza on 6/6/18    Hospital Course:    ankle/ foot  Pain changed:  how much to take        CONTINUE taking these medications    atorvastatin 40 MG Tabs  Commonly known as:  LIPITOR  TAKE ONE TABLET BY MOUTH NIGHTLY     Clopidogrel Bisulfate 75 MG Tabs  Commonly known as:  PLAVIX  Take 1 tablet (75 mg total) by m ankle x2 days. No known injury. TECHNIQUE:  3 views were obtained. FINDINGS:  BONES: Forefoot amputation at the level of the metatarsal bases of the first through fifth metatarsals.  Minimal erosive bony changes involving the resection site at the base o thickening and increased signal within the peroneus longus tendon with increased fluid within the peroneal tendon sheath. No full thickness tear of the peroneus longus. Mild ill-defined enhancement throughout the tendon sheath.   MEDIAL LIGAMENTS AND SOFT T and throughout the tibiotalar joint. OTHER:  There is extensive subcutaneous edema seen throughout the entire dorsal foot with overlying skin thickening.  There is extensive edema seen throughout the plantar foot musculature with fatty atrophy of the planta REQUESTING DAYS BE COVERED FROM June [facility], [facility]. June 8TH. IF YOU NEED ANYTHING PLEASE CONTACT US. THANK YOU.

## 2018-08-06 ENCOUNTER — APPOINTMENT (OUTPATIENT)
Dept: GENERAL RADIOLOGY | Facility: HOSPITAL | Age: 58
End: 2018-08-06
Attending: INTERNAL MEDICINE
Payer: MEDICAID

## 2018-08-06 ENCOUNTER — HOSPITAL ENCOUNTER (OUTPATIENT)
Dept: INTERVENTIONAL RADIOLOGY/VASCULAR | Facility: HOSPITAL | Age: 58
Discharge: HOME | End: 2018-08-07
Attending: INTERNAL MEDICINE | Admitting: INTERNAL MEDICINE
Payer: MEDICAID

## 2018-08-06 DIAGNOSIS — I25.5 ISCHEMIC CARDIOMYOPATHY: ICD-10-CM

## 2018-08-06 LAB
GLUCOSE BLDC GLUCOMTR-MCNC: 110 MG/DL (ref 70–99)
GLUCOSE BLDC GLUCOMTR-MCNC: 121 MG/DL (ref 70–99)
GLUCOSE BLDC GLUCOMTR-MCNC: 168 MG/DL (ref 70–99)
MRSA DNA SPEC QL NAA+PROBE: NEGATIVE

## 2018-08-06 PROCEDURE — 33216 INSERT 1 ELECTRODE PM-DEFIB: CPT

## 2018-08-06 PROCEDURE — 02HK3KZ INSERTION OF DEFIBRILLATOR LEAD INTO RIGHT VENTRICLE, PERCUTANEOUS APPROACH: ICD-10-PCS | Performed by: INTERNAL MEDICINE

## 2018-08-06 PROCEDURE — 99153 MOD SED SAME PHYS/QHP EA: CPT

## 2018-08-06 PROCEDURE — A4216 STERILE WATER/SALINE, 10 ML: HCPCS

## 2018-08-06 PROCEDURE — 82962 GLUCOSE BLOOD TEST: CPT

## 2018-08-06 PROCEDURE — 33249 INSJ/RPLCMT DEFIB W/LEAD(S): CPT

## 2018-08-06 PROCEDURE — 87641 MR-STAPH DNA AMP PROBE: CPT | Performed by: INTERNAL MEDICINE

## 2018-08-06 PROCEDURE — 0JH608Z INSERTION OF DEFIBRILLATOR GENERATOR INTO CHEST SUBCUTANEOUS TISSUE AND FASCIA, OPEN APPROACH: ICD-10-PCS | Performed by: INTERNAL MEDICINE

## 2018-08-06 PROCEDURE — 71045 X-RAY EXAM CHEST 1 VIEW: CPT | Performed by: INTERNAL MEDICINE

## 2018-08-06 PROCEDURE — 99152 MOD SED SAME PHYS/QHP 5/>YRS: CPT

## 2018-08-06 PROCEDURE — 93641 EP EVL 1/2CHMB PAC CVDFB TST: CPT

## 2018-08-06 RX ORDER — ACETAMINOPHEN AND CODEINE PHOSPHATE 300; 30 MG/1; MG/1
2 TABLET ORAL EVERY 4 HOURS PRN
Status: DISCONTINUED | OUTPATIENT
Start: 2018-08-06 | End: 2018-08-07

## 2018-08-06 RX ORDER — LEVOTHYROXINE SODIUM 0.05 MG/1
50 TABLET ORAL
Status: DISCONTINUED | OUTPATIENT
Start: 2018-08-06 | End: 2018-08-07

## 2018-08-06 RX ORDER — BACITRACIN 50000 [USP'U]/1
INJECTION, POWDER, LYOPHILIZED, FOR SOLUTION INTRAMUSCULAR
Status: COMPLETED
Start: 2018-08-06 | End: 2018-08-06

## 2018-08-06 RX ORDER — ATORVASTATIN CALCIUM 40 MG/1
40 TABLET, FILM COATED ORAL NIGHTLY
Status: DISCONTINUED | OUTPATIENT
Start: 2018-08-06 | End: 2018-08-07

## 2018-08-06 RX ORDER — METOPROLOL SUCCINATE 25 MG/1
25 TABLET, EXTENDED RELEASE ORAL DAILY
Status: DISCONTINUED | OUTPATIENT
Start: 2018-08-07 | End: 2018-08-07

## 2018-08-06 RX ORDER — SODIUM CHLORIDE 9 MG/ML
INJECTION, SOLUTION INTRAVENOUS CONTINUOUS
Status: DISCONTINUED | OUTPATIENT
Start: 2018-08-06 | End: 2018-08-07

## 2018-08-06 RX ORDER — DIPHENHYDRAMINE HYDROCHLORIDE 50 MG/ML
INJECTION INTRAMUSCULAR; INTRAVENOUS
Status: COMPLETED
Start: 2018-08-06 | End: 2018-08-06

## 2018-08-06 RX ORDER — ONDANSETRON 2 MG/ML
4 INJECTION INTRAMUSCULAR; INTRAVENOUS EVERY 6 HOURS PRN
Status: DISCONTINUED | OUTPATIENT
Start: 2018-08-06 | End: 2018-08-07

## 2018-08-06 RX ORDER — LIDOCAINE HYDROCHLORIDE AND EPINEPHRINE 10; 10 MG/ML; UG/ML
INJECTION, SOLUTION INFILTRATION; PERINEURAL
Status: COMPLETED
Start: 2018-08-06 | End: 2018-08-06

## 2018-08-06 RX ORDER — MIDAZOLAM HYDROCHLORIDE 1 MG/ML
INJECTION INTRAMUSCULAR; INTRAVENOUS
Status: COMPLETED
Start: 2018-08-06 | End: 2018-08-06

## 2018-08-06 RX ORDER — ASPIRIN 325 MG
325 TABLET, DELAYED RELEASE (ENTERIC COATED) ORAL DAILY
Status: DISCONTINUED | OUTPATIENT
Start: 2018-08-07 | End: 2018-08-07

## 2018-08-06 RX ORDER — CEFAZOLIN SODIUM/WATER 2 G/20 ML
SYRINGE (ML) INTRAVENOUS
Status: COMPLETED
Start: 2018-08-06 | End: 2018-08-06

## 2018-08-06 RX ORDER — LISINOPRIL 40 MG/1
40 TABLET ORAL DAILY
Status: DISCONTINUED | OUTPATIENT
Start: 2018-08-06 | End: 2018-08-07

## 2018-08-06 RX ORDER — SPIRONOLACTONE 25 MG/1
25 TABLET ORAL DAILY
Status: DISCONTINUED | OUTPATIENT
Start: 2018-08-06 | End: 2018-08-07

## 2018-08-06 RX ORDER — CEFAZOLIN SODIUM/WATER 2 G/20 ML
2 SYRINGE (ML) INTRAVENOUS EVERY 8 HOURS
Status: COMPLETED | OUTPATIENT
Start: 2018-08-06 | End: 2018-08-07

## 2018-08-06 RX ORDER — 0.9 % SODIUM CHLORIDE 0.9 %
VIAL (ML) INJECTION
Status: COMPLETED
Start: 2018-08-06 | End: 2018-08-06

## 2018-08-06 RX ORDER — SODIUM CHLORIDE 9 MG/ML
INJECTION, SOLUTION INTRAVENOUS
Status: COMPLETED
Start: 2018-08-06 | End: 2018-08-06

## 2018-08-06 RX ADMIN — ATORVASTATIN CALCIUM 40 MG: 40 TABLET, FILM COATED ORAL at 20:55:00

## 2018-08-06 RX ADMIN — ACETAMINOPHEN AND CODEINE PHOSPHATE 2 TABLET: 300; 30 TABLET ORAL at 17:26:00

## 2018-08-06 RX ADMIN — 0.9 % SODIUM CHLORIDE 10 ML: 0.9 % VIAL (ML) INJECTION at 20:57:00

## 2018-08-06 RX ADMIN — Medication 1000 MG: at 18:42:00

## 2018-08-06 RX ADMIN — CEFAZOLIN SODIUM/WATER 2 G: 2 G/20 ML SYRINGE (ML) INTRAVENOUS at 20:56:00

## 2018-08-06 RX ADMIN — SODIUM CHLORIDE: 9 INJECTION, SOLUTION INTRAVENOUS at 13:30:00

## 2018-08-06 RX ADMIN — SODIUM CHLORIDE: 9 INJECTION, SOLUTION INTRAVENOUS at 13:22:00

## 2018-08-06 NOTE — PROCEDURES
[facility]    PATIENT'S NAME: Darek GARZA   ATTENDING PHYSICIAN: Hira Ballard. Talia Rodrigez MD   OPERATING PHYSICIAN: Hira Ballard.  Talia Rodrigez MD   PATIENT ACCOUNT#:   [id number]    LOCATION:  [address]  MEDICAL RECORD #:   [id number]       DATE was irrigated with antibiotic solution. The lead was tested at 10 mV. There was no diaphragmatic stimulation. The lead was attached to a [employer] model DFV B1D4, serial [id number].   This was placed in the pocket with excess lead posterior to and was

## 2018-08-07 ENCOUNTER — APPOINTMENT (OUTPATIENT)
Dept: GENERAL RADIOLOGY | Facility: HOSPITAL | Age: 58
End: 2018-08-07
Attending: INTERNAL MEDICINE
Payer: MEDICAID

## 2018-08-07 VITALS
HEART RATE: 60 BPM | HEIGHT: 64 IN | TEMPERATURE: 98 F | OXYGEN SATURATION: 96 % | WEIGHT: 153.81 LBS | BODY MASS INDEX: 26.26 KG/M2 | RESPIRATION RATE: 18 BRPM | SYSTOLIC BLOOD PRESSURE: 158 MMHG | DIASTOLIC BLOOD PRESSURE: 82 MMHG

## 2018-08-07 LAB — GLUCOSE BLDC GLUCOMTR-MCNC: 128 MG/DL (ref 70–99)

## 2018-08-07 PROCEDURE — 71046 X-RAY EXAM CHEST 2 VIEWS: CPT | Performed by: INTERNAL MEDICINE

## 2018-08-07 PROCEDURE — 82962 GLUCOSE BLOOD TEST: CPT

## 2018-08-07 PROCEDURE — A4216 STERILE WATER/SALINE, 10 ML: HCPCS

## 2018-08-07 RX ORDER — 0.9 % SODIUM CHLORIDE 0.9 %
VIAL (ML) INJECTION
Status: COMPLETED
Start: 2018-08-07 | End: 2018-08-07

## 2018-08-07 RX ADMIN — ASPIRIN 325 MG: 325 MG TABLET, DELAYED RELEASE (ENTERIC COATED) ORAL at 07:47:00

## 2018-08-07 RX ADMIN — LEVOTHYROXINE SODIUM 50 MCG: 0.05 TABLET ORAL at 06:30:00

## 2018-08-07 RX ADMIN — Medication 1000 MG: at 07:47:00

## 2018-08-07 RX ADMIN — 0.9 % SODIUM CHLORIDE 10 ML: 0.9 % VIAL (ML) INJECTION at 04:42:00

## 2018-08-07 RX ADMIN — SPIRONOLACTONE 25 MG: 25 TABLET ORAL at 07:46:00

## 2018-08-07 RX ADMIN — METOPROLOL SUCCINATE 25 MG: 25 TABLET, EXTENDED RELEASE ORAL at 07:47:00

## 2018-08-07 RX ADMIN — CEFAZOLIN SODIUM/WATER 2 G: 2 G/20 ML SYRINGE (ML) INTRAVENOUS at 04:42:00

## 2018-08-07 RX ADMIN — LISINOPRIL 40 MG: 40 TABLET ORAL at 07:47:00

## 2018-08-07 NOTE — PROGRESS NOTES
ASSESSMENT/PLAN:     IMP  1. Ischemic cm; no chf or angina    2.  Post icd doing well  rec ok to dc  Activity, follow up etc reviewed and ? answered    [unfilled]      --------------------------------------------------------------------------------- use: No                 Medications (Active prior to today's visit):    No current outpatient prescriptions on file. Allergies:  No Known Allergies    ROS:     Cardiovascular and respiratory review of systems are negative, except as described above.   Heather Jordan

## 2018-08-07 NOTE — PLAN OF CARE
Problem: Patient Centered Care  Goal: Patient preferences are identified and integrated in the patient's plan of care  Interventions:  - What would you like us to know as we care for you?  Pt has hx of CABG  - Provide timely, complete, and accurate informat stability  - Monitor arterial and/or venous puncture sites for bleeding and/or hematoma  - Assess quality of pulses, skin color and temperature  - Assess for signs of decreased coronary artery perfusion - ex.  Angina  - Evaluate fluid balance, assess for ed

## 2018-08-21 PROBLEM — Z95.810 ICD (IMPLANTABLE CARDIOVERTER-DEFIBRILLATOR) IN PLACE: Status: ACTIVE | Noted: 2018-08-21

## 2019-12-03 ENCOUNTER — APPOINTMENT (OUTPATIENT)
Dept: GENERAL RADIOLOGY | Facility: HOSPITAL | Age: 59
End: 2019-12-03
Attending: EMERGENCY MEDICINE
Payer: MEDICAID

## 2019-12-03 ENCOUNTER — HOSPITAL ENCOUNTER (EMERGENCY)
Facility: HOSPITAL | Age: 59
Discharge: HOME | End: 2019-12-03
Attending: EMERGENCY MEDICINE
Payer: MEDICAID

## 2019-12-03 VITALS
RESPIRATION RATE: 18 BRPM | BODY MASS INDEX: 26.29 KG/M2 | WEIGHT: 154 LBS | DIASTOLIC BLOOD PRESSURE: 78 MMHG | HEIGHT: 64 IN | TEMPERATURE: 98 F | SYSTOLIC BLOOD PRESSURE: 135 MMHG | HEART RATE: 62 BPM | OXYGEN SATURATION: 98 %

## 2019-12-03 DIAGNOSIS — L97.511 DIABETIC ULCER OF RIGHT FOOT ASSOCIATED WITH DIABETES MELLITUS DUE TO UNDERLYING CONDITION, LIMITED TO BREAKDOWN OF SKIN, UNSPECIFIED PART OF FOOT (HCC): Primary | ICD-10-CM

## 2019-12-03 DIAGNOSIS — L03.115 CELLULITIS OF RIGHT LOWER EXTREMITY: ICD-10-CM

## 2019-12-03 DIAGNOSIS — E08.621 DIABETIC ULCER OF RIGHT FOOT ASSOCIATED WITH DIABETES MELLITUS DUE TO UNDERLYING CONDITION, LIMITED TO BREAKDOWN OF SKIN, UNSPECIFIED PART OF FOOT (HCC): Primary | ICD-10-CM

## 2019-12-03 PROCEDURE — 99284 EMERGENCY DEPT VISIT MOD MDM: CPT

## 2019-12-03 PROCEDURE — 87205 SMEAR GRAM STAIN: CPT | Performed by: EMERGENCY MEDICINE

## 2019-12-03 PROCEDURE — 87147 CULTURE TYPE IMMUNOLOGIC: CPT | Performed by: EMERGENCY MEDICINE

## 2019-12-03 PROCEDURE — 85025 COMPLETE CBC W/AUTO DIFF WBC: CPT | Performed by: EMERGENCY MEDICINE

## 2019-12-03 PROCEDURE — 82962 GLUCOSE BLOOD TEST: CPT

## 2019-12-03 PROCEDURE — 80048 BASIC METABOLIC PNL TOTAL CA: CPT | Performed by: EMERGENCY MEDICINE

## 2019-12-03 PROCEDURE — 96365 THER/PROPH/DIAG IV INF INIT: CPT

## 2019-12-03 PROCEDURE — 87070 CULTURE OTHR SPECIMN AEROBIC: CPT | Performed by: EMERGENCY MEDICINE

## 2019-12-03 PROCEDURE — 87186 SC STD MICRODIL/AGAR DIL: CPT | Performed by: EMERGENCY MEDICINE

## 2019-12-03 PROCEDURE — 73630 X-RAY EXAM OF FOOT: CPT | Performed by: EMERGENCY MEDICINE

## 2019-12-03 PROCEDURE — 87077 CULTURE AEROBIC IDENTIFY: CPT | Performed by: EMERGENCY MEDICINE

## 2019-12-03 RX ORDER — DOXYCYCLINE HYCLATE 100 MG/1
100 CAPSULE ORAL 2 TIMES DAILY
Qty: 20 CAPSULE | Refills: 0 | Status: ON HOLD | OUTPATIENT
Start: 2019-12-03 | End: 2019-12-12

## 2019-12-03 RX ORDER — CIPROFLOXACIN 500 MG/1
500 TABLET, FILM COATED ORAL 2 TIMES DAILY
Qty: 20 TABLET | Refills: 0 | Status: ON HOLD | OUTPATIENT
Start: 2019-12-03 | End: 2019-12-12

## 2019-12-03 RX ORDER — CIPROFLOXACIN 500 MG/1
500 TABLET, FILM COATED ORAL ONCE
Status: COMPLETED | OUTPATIENT
Start: 2019-12-03 | End: 2019-12-03

## 2019-12-03 NOTE — ED PROVIDER NOTES
Patient Seen in: [facility] AND [facility] Emergency Department    History   Patient presents with:  Cellulitis    Stated Complaint: Right foot drainage/ulcer    HPI    77-year-old female with past medical history of right foot osteomyelitis status post partial am Oral Tab,  TAKE ONE TABLET BY MOUTH IN THE MORNING BEFORE BREAKFAST   lisinopril 40 MG Oral Tab,  Take 1 tablet (40 mg total) by mouth daily.    ONETOUCH ULTRASOFT LANCETS Does not apply Misc,  Check sugar daily       Family History   Problem Relation Age o Alert. RLE proximally and distally with 5/5 strength. Skin: Skin is warm. Psychiatric: Cooperative. Nursing note and vitals reviewed.         ED Course     Labs Reviewed   BASIC METABOLIC PANEL (8) - Abnormal; Notable for the following components: CONCLUSION:  1. Postsurgical changes of extensive 1st through 5th digit transmetatarsal amputation. No definite radiographic findings to suggest osteomyelitis. 2. Nonspecific diffuse soft tissue swelling involving the mid foot.    Dictated by (CST):

## 2019-12-11 ENCOUNTER — HOSPITAL ENCOUNTER (OUTPATIENT)
Facility: HOSPITAL | Age: 59
Setting detail: OBSERVATION
Discharge: HOME | End: 2019-12-12
Attending: EMERGENCY MEDICINE | Admitting: HOSPITALIST
Payer: MEDICAID

## 2019-12-11 ENCOUNTER — APPOINTMENT (OUTPATIENT)
Dept: GENERAL RADIOLOGY | Facility: HOSPITAL | Age: 59
End: 2019-12-11
Attending: EMERGENCY MEDICINE
Payer: MEDICAID

## 2019-12-11 DIAGNOSIS — I50.21 ACUTE SYSTOLIC HEART FAILURE (HCC): ICD-10-CM

## 2019-12-11 DIAGNOSIS — E08.621 DIABETIC ULCER OF MIDFOOT ASSOCIATED WITH DIABETES MELLITUS DUE TO UNDERLYING CONDITION, WITH FAT LAYER EXPOSED, UNSPECIFIED LATERALITY (HCC): Primary | ICD-10-CM

## 2019-12-11 DIAGNOSIS — N17.9 ACUTE KIDNEY INJURY (HCC): ICD-10-CM

## 2019-12-11 DIAGNOSIS — E11.59 CONTROLLED TYPE 2 DIABETES MELLITUS WITH OTHER CIRCULATORY COMPLICATION, WITHOUT LONG-TERM CURRENT USE OF INSULIN (HCC): ICD-10-CM

## 2019-12-11 DIAGNOSIS — L97.402 DIABETIC ULCER OF MIDFOOT ASSOCIATED WITH DIABETES MELLITUS DUE TO UNDERLYING CONDITION, WITH FAT LAYER EXPOSED, UNSPECIFIED LATERALITY (HCC): Primary | ICD-10-CM

## 2019-12-11 DIAGNOSIS — E03.9 ACQUIRED HYPOTHYROIDISM: ICD-10-CM

## 2019-12-11 DIAGNOSIS — I27.20 PULMONARY HTN (HCC): ICD-10-CM

## 2019-12-11 PROBLEM — R73.9 HYPERGLYCEMIA: Status: ACTIVE | Noted: 2019-12-11

## 2019-12-11 PROBLEM — E87.20 METABOLIC ACIDOSIS: Status: ACTIVE | Noted: 2019-12-11

## 2019-12-11 PROBLEM — E87.2 METABOLIC ACIDOSIS: Status: ACTIVE | Noted: 2019-12-11

## 2019-12-11 PROCEDURE — 73630 X-RAY EXAM OF FOOT: CPT | Performed by: EMERGENCY MEDICINE

## 2019-12-11 PROCEDURE — 99222 1ST HOSP IP/OBS MODERATE 55: CPT | Performed by: HOSPITALIST

## 2019-12-11 RX ORDER — LISINOPRIL 20 MG/1
20 TABLET ORAL DAILY
Status: DISCONTINUED | OUTPATIENT
Start: 2019-12-12 | End: 2019-12-12

## 2019-12-11 RX ORDER — ACETAMINOPHEN 325 MG/1
650 TABLET ORAL EVERY 6 HOURS PRN
Status: DISCONTINUED | OUTPATIENT
Start: 2019-12-11 | End: 2019-12-12

## 2019-12-11 RX ORDER — TEMAZEPAM 7.5 MG/1
15 CAPSULE ORAL NIGHTLY PRN
Status: DISCONTINUED | OUTPATIENT
Start: 2019-12-11 | End: 2019-12-12

## 2019-12-11 RX ORDER — AMLODIPINE BESYLATE 5 MG/1
5 TABLET ORAL DAILY
Status: DISCONTINUED | OUTPATIENT
Start: 2019-12-12 | End: 2019-12-12

## 2019-12-11 RX ORDER — ONDANSETRON 2 MG/ML
4 INJECTION INTRAMUSCULAR; INTRAVENOUS EVERY 6 HOURS PRN
Status: DISCONTINUED | OUTPATIENT
Start: 2019-12-11 | End: 2019-12-12

## 2019-12-11 RX ORDER — SODIUM CHLORIDE 9 MG/ML
125 INJECTION, SOLUTION INTRAVENOUS CONTINUOUS
Status: DISCONTINUED | OUTPATIENT
Start: 2019-12-11 | End: 2019-12-12

## 2019-12-11 RX ORDER — DEXTROSE MONOHYDRATE 25 G/50ML
50 INJECTION, SOLUTION INTRAVENOUS AS NEEDED
Status: DISCONTINUED | OUTPATIENT
Start: 2019-12-11 | End: 2019-12-12

## 2019-12-11 RX ORDER — HYDROCHLOROTHIAZIDE 25 MG/1
25 TABLET ORAL DAILY
Status: DISCONTINUED | OUTPATIENT
Start: 2019-12-12 | End: 2019-12-12

## 2019-12-11 RX ORDER — SODIUM CHLORIDE 9 MG/ML
INJECTION, SOLUTION INTRAVENOUS CONTINUOUS
Status: ACTIVE | OUTPATIENT
Start: 2019-12-11 | End: 2019-12-11

## 2019-12-11 RX ORDER — HYDROCHLOROTHIAZIDE 25 MG/1
25 TABLET ORAL DAILY
Status: ON HOLD | COMMUNITY
End: 2019-12-12

## 2019-12-11 RX ORDER — HYDROCODONE BITARTRATE AND ACETAMINOPHEN 5; 325 MG/1; MG/1
1 TABLET ORAL EVERY 4 HOURS PRN
Status: DISCONTINUED | OUTPATIENT
Start: 2019-12-11 | End: 2019-12-12

## 2019-12-11 RX ORDER — HEPARIN SODIUM 5000 [USP'U]/ML
5000 INJECTION, SOLUTION INTRAVENOUS; SUBCUTANEOUS EVERY 12 HOURS SCHEDULED
Status: DISCONTINUED | OUTPATIENT
Start: 2019-12-11 | End: 2019-12-12

## 2019-12-11 RX ORDER — SODIUM CHLORIDE 0.9 % (FLUSH) 0.9 %
3 SYRINGE (ML) INJECTION AS NEEDED
Status: DISCONTINUED | OUTPATIENT
Start: 2019-12-11 | End: 2019-12-12

## 2019-12-11 RX ORDER — GABAPENTIN 300 MG/1
300 CAPSULE ORAL NIGHTLY
Status: DISCONTINUED | OUTPATIENT
Start: 2019-12-11 | End: 2019-12-12

## 2019-12-11 RX ORDER — ATORVASTATIN CALCIUM 40 MG/1
80 TABLET, FILM COATED ORAL NIGHTLY
Status: DISCONTINUED | OUTPATIENT
Start: 2019-12-12 | End: 2019-12-12

## 2019-12-11 RX ORDER — LEVOTHYROXINE SODIUM 0.05 MG/1
50 TABLET ORAL
Status: DISCONTINUED | OUTPATIENT
Start: 2019-12-12 | End: 2019-12-12

## 2019-12-11 RX ORDER — AMLODIPINE BESYLATE 5 MG/1
5 TABLET ORAL DAILY
Status: ON HOLD | COMMUNITY
End: 2019-12-12

## 2019-12-11 RX ORDER — VANCOMYCIN/0.9 % SOD CHLORIDE 1.75 G/5
25 PLASTIC BAG, INJECTION (ML) INTRAVENOUS ONCE
Status: COMPLETED | OUTPATIENT
Start: 2019-12-11 | End: 2019-12-11

## 2019-12-11 RX ORDER — ASPIRIN 325 MG
325 TABLET, DELAYED RELEASE (ENTERIC COATED) ORAL DAILY
Status: DISCONTINUED | OUTPATIENT
Start: 2019-12-12 | End: 2019-12-12

## 2019-12-11 RX ORDER — GABAPENTIN 300 MG/1
300 CAPSULE ORAL NIGHTLY
Status: ON HOLD | COMMUNITY
End: 2019-12-12

## 2019-12-11 NOTE — CM/SW NOTE
12/11/19 1400   CM/SW Screening   Referral [id number] staff; Chart review;Nursing rounds   Patient's Mental Status Alert;Oriented   Patient's Home Environment Condo/Apt with elevator   Patient lives with Spouse   Patient [address]

## 2019-12-11 NOTE — H&P
[facility]    PATIENT'S NAME: Delia El   ATTENDING PHYSICIAN: Sujata Holley MD   PATIENT ACCOUNT#:   [id number]    LOCATION:  [place] [address]  MEDICAL RECORD #:   [id number]       YOB: 1960  ADMISSION DATE:       12/11/201 secondary to osteomyelitis. MEDICATIONS:  Please see medication reconciliation list.    FAMILY HISTORY:  Both parents had coronary artery disease. SOCIAL HISTORY:  No tobacco, alcohol or drug use. Lives with her family.   Usually independent for Immediatelyi Cultures showed pseudomonas and MSSA. We will start her on IV cefazolin and Zosyn. Obtain ID consult, podiatry consult, and wound service. The patient could potentially be discharged on p.o. Levaquin and wound care. Further recommendations to follow.

## 2019-12-11 NOTE — ED INITIAL ASSESSMENT (HPI)
Diabetic wound to the bottom of the right foot that Is not improving, Pt seen here a week ago and Rx Abx - pt states \"not getting any better\". Denies fevers/chills.

## 2019-12-11 NOTE — CONSULTS
[facility] ID CONSULT NOTE    Romero Glover Patient Status:  Emergency    1960 MRN [id number]   Location [address] Attending Giana Florian MD   Hosp Day # 0 PCP Kelsey Payan MD first pregnancy   • REPLACE AORTIC VALVE W/BYP       Family History   Problem Relation Age of Onset   • Heart Disorder Father 61        MI tob   • Hypertension Father    • Lipids Father    • Heart Disorder Mother 80        cabg    • Other (hypothyroid) Wale Supple.   Cardiovascular: Regular rate and rhythm, ICD palpated, old sternal incision healed  Respiratory: CTAB  Abdomen: Soft, NTND  Musculoskeletal: No edema noted  Integument: R TMA with plantar foot wound, no clear deep involvement, no TTP, no drainage, RN.    Thank you for allowing us to participate in the care of this patient. Please do not hesitate to call if you have any questions. We will continue to follow with you and will make further recommendations based on his progress.     Jonnie Caldera PA-C

## 2019-12-11 NOTE — CM/SW NOTE
MDO for Adv Dir, pt requested to change her POA. CM provided pt with copy of Adv Dir packet and instructions for completion. / to remain available for support and/or discharge planning.      Kelly Bautista RN

## 2019-12-11 NOTE — ED NOTES
Orders for admission, patient is aware of plan and ready to go upstairs. Any questions, please call ED RN James Mota  at extension [phone].

## 2019-12-11 NOTE — WOUND PROGRESS NOTE
WOUND CARE NOTE    History:  Past Medical History:   Diagnosis Date   • CAD (coronary artery disease)    • Chronic systolic CHF (congestive heart failure) ([facility] 2/27/2017   • Clubfoot, congenital     right   • Congenital hand deformity    • Congestive he to re-open. Clean with granulation tissue to wound bed. Wound dressed with xeroform and foam. Recommend daily and PRN changes. Appreciate podiatry consult for possible sx. Patient reports had a previous callused area that continued to re-open and had sx.  P

## 2019-12-12 VITALS
RESPIRATION RATE: 18 BRPM | BODY MASS INDEX: 26.29 KG/M2 | OXYGEN SATURATION: 96 % | HEIGHT: 64 IN | HEART RATE: 66 BPM | TEMPERATURE: 98 F | WEIGHT: 154 LBS | DIASTOLIC BLOOD PRESSURE: 69 MMHG | SYSTOLIC BLOOD PRESSURE: 150 MMHG

## 2019-12-12 PROCEDURE — 99217 OBSERVATION CARE DISCHARGE: CPT | Performed by: HOSPITALIST

## 2019-12-12 RX ORDER — ATORVASTATIN CALCIUM 40 MG/1
80 TABLET, FILM COATED ORAL NIGHTLY
Qty: 30 TABLET | Refills: 0 | Status: SHIPPED | OUTPATIENT
Start: 2019-12-12

## 2019-12-12 RX ORDER — LISINOPRIL 40 MG/1
40 TABLET ORAL DAILY
Qty: 90 TABLET | Refills: 0 | Status: SHIPPED | OUTPATIENT
Start: 2019-12-12

## 2019-12-12 RX ORDER — CIPROFLOXACIN 500 MG/1
500 TABLET, FILM COATED ORAL 2 TIMES DAILY
Qty: 10 TABLET | Refills: 0 | Status: SHIPPED | OUTPATIENT
Start: 2019-12-12 | End: 2019-12-17

## 2019-12-12 RX ORDER — HYDROCODONE BITARTRATE AND ACETAMINOPHEN 5; 325 MG/1; MG/1
1 TABLET ORAL EVERY 4 HOURS PRN
Qty: 20 TABLET | Refills: 0 | Status: SHIPPED | OUTPATIENT
Start: 2019-12-12 | End: 2020-01-06

## 2019-12-12 RX ORDER — LEVOTHYROXINE SODIUM 0.05 MG/1
TABLET ORAL
Qty: 30 TABLET | Refills: 10 | Status: SHIPPED | OUTPATIENT
Start: 2019-12-12

## 2019-12-12 RX ORDER — METOPROLOL SUCCINATE 25 MG/1
25 TABLET, EXTENDED RELEASE ORAL DAILY
Qty: 30 TABLET | Refills: 0 | Status: SHIPPED | OUTPATIENT
Start: 2019-12-12 | End: 2020-01-06

## 2019-12-12 RX ORDER — AMLODIPINE BESYLATE 5 MG/1
5 TABLET ORAL DAILY
Qty: 30 TABLET | Refills: 0 | Status: SHIPPED | OUTPATIENT
Start: 2019-12-12

## 2019-12-12 RX ORDER — HYDROCHLOROTHIAZIDE 25 MG/1
25 TABLET ORAL DAILY
Qty: 30 TABLET | Refills: 0 | Status: SHIPPED | OUTPATIENT
Start: 2019-12-12

## 2019-12-12 RX ORDER — DOXYCYCLINE HYCLATE 100 MG/1
100 CAPSULE ORAL 2 TIMES DAILY
Qty: 10 CAPSULE | Refills: 0 | Status: SHIPPED | OUTPATIENT
Start: 2019-12-12 | End: 2019-12-17

## 2019-12-12 RX ORDER — GABAPENTIN 300 MG/1
300 CAPSULE ORAL NIGHTLY
Qty: 30 CAPSULE | Refills: 0 | Status: SHIPPED | OUTPATIENT
Start: 2019-12-12

## 2019-12-12 NOTE — PLAN OF CARE
Problem: Patient Centered Care  Goal: Patient preferences are identified and integrated in the patient's plan of care  Description  Interventions:  - What would you like us to know as we care for you?   - Provide timely, complete, and accurate informatio factors for pressure ulcer development  - Assess and document skin integrity  - Monitor for areas of redness and/or skin breakdown  - Initiate interventions, skin care algorithm/standards of care as needed  Outcome: Progressing  Goal: Incision(s), wounds(s

## 2019-12-12 NOTE — PLAN OF CARE
Family at bedside. Pt given discharge instruction. Pt understands discharge instructions. No signs of distress at this time.

## 2019-12-12 NOTE — PROGRESS NOTES
[facility] ID PROGRESS NOTE    Omega Radford Patient Status:  Inpatient    1960 MRN [id number]   Location [facility] 5SW/SE Attending Vitor Villagomez MD   Hosp Day # 1 PCP Cj Murdock MD     Subjective:  Awake, awaiting surgical evaluat right ankle ([address]     Osteomyelitis of right ankle, unspecified type ([address]     ICD (implantable cardioverter-defibrillator) [employer]     Metabolic acidosis     Hyperglycemia     Diabetic ulcer of midfoot associated with diabetes mellitus due to underlying

## 2019-12-12 NOTE — DISCHARGE SUMMARY
[facility]    Discharge Summary    Romero Glover Patient Status:  Observation    1960 MRN [id number]   Location [facility] 5SW/SE Attending Naz Carias MD   Hosp Day # 0 PCP Kelsey Payan MD     Date of Admissi underlying condition, with fat layer exposed ([address]     Diabetic ulcer of midfoot associated with diabetes mellitus due to underlying condition, with fat layer exposed, unspecified laterality ([address]      Reason for Admission: Right foot stump broken callus a follow up with any podiatry or primary care physicians because of insurance reasons. CBC and chemistry unremarkable. Bicarbonate is 18, BUN 30, creatinine 1.34. GFR is 43, around baseline. X-ray of the foot showed no evidence of osteomyelitis.   The pat VIBRAMYCIN      Take 1 capsule (100 mg total) by mouth 2 (two) times daily for 5 days.    Stop taking on:  December 17, 2019  Quantity:  10 capsule  Refills:  0     gabapentin 300 MG Caps  Commonly known as:  NEURONTIN      Take 1 capsule (300 mg total) by Tabs         Follow-up With  Details  Why  Contact Info   QUYEN LOZA  Schedule an appointment as soon as possible for a visit in 3 weeks  You requested to make your own appointment.  Please call Quyen Ramirez________ within 2-3 days

## 2019-12-12 NOTE — PLAN OF CARE
A/Ox4. VSS. 2 doses of pain medication given. Fall risk precautions appropriate for pt: call light within reach, non-skid soaks. Blood sugars controlled with novolog. Discharge instructions given. Discussed medications and follow up appointments.  Family at better    Interventions:   - Monitor vital signs  - Monitor labs  - Monitor blood glucose levels  - Administer medications per order  - Assist with ADLs  - Update / inform patient on plan of care  - See additional Care Plan goals for specific interventions

## 2019-12-13 ENCOUNTER — TELEPHONE (OUTPATIENT)
Dept: CASE MANAGEMENT | Facility: HOSPITAL | Age: 59
End: 2019-12-13

## 2019-12-13 NOTE — ED PROVIDER NOTES
Patient Seen in: [facility] AND CLINICS 5sw/se      History   Patient presents with:  Wound    Stated Complaint: wound    HPI    The patient is a 39-year-old female with a history of diabetes, hypertension, coronary artery disease status post transmetatarsal Quit date: 1999        Years since quittin.9      Smokeless tobacco: Never Used    Alcohol use: No      Alcohol/week: 0.0 standard drinks    Drug use:  No             Review of Systems    Positive for stated complaint: wound  Other systems a Findings: Erythema and lesion present. Neurological:      Mental Status: She is alert and oriented to person, place, and time. Sensory: Sensory deficit (Decreased light touch bilateral lower extremities) present.       Deep Tendon Reflexes: Reflex All other components within normal limits   POCT GLUCOSE - Abnormal; Notable for the following components:    POC Glucose  163 (*)     All other components within normal limits   CBC W/ DIFFERENTIAL - Abnormal; Notable for the following components:    RDW Admission disposition: 12/11/2019 11:23 AM                   Disposition and Plan     Clinical Impression:  Diabetic ulcer of midfoot associated with diabetes mellitus due to underlying condition, with fat layer exposed, unspecified laterality [facility]  (prim

## 2020-01-04 ENCOUNTER — HOSPITAL ENCOUNTER (EMERGENCY)
Facility: HOSPITAL | Age: 60
Discharge: HOME | End: 2020-01-04
Attending: EMERGENCY MEDICINE
Payer: MEDICAID

## 2020-01-04 ENCOUNTER — APPOINTMENT (OUTPATIENT)
Dept: GENERAL RADIOLOGY | Facility: HOSPITAL | Age: 60
End: 2020-01-04
Attending: EMERGENCY MEDICINE
Payer: MEDICAID

## 2020-01-04 VITALS
BODY MASS INDEX: 26.46 KG/M2 | HEIGHT: 64 IN | DIASTOLIC BLOOD PRESSURE: 72 MMHG | WEIGHT: 155 LBS | RESPIRATION RATE: 19 BRPM | OXYGEN SATURATION: 98 % | HEART RATE: 74 BPM | TEMPERATURE: 98 F | SYSTOLIC BLOOD PRESSURE: 128 MMHG

## 2020-01-04 DIAGNOSIS — E11.621 DIABETIC ULCER OF RIGHT MIDFOOT ASSOCIATED WITH TYPE 2 DIABETES MELLITUS, UNSPECIFIED ULCER STAGE (HCC): Primary | ICD-10-CM

## 2020-01-04 DIAGNOSIS — L97.419 DIABETIC ULCER OF RIGHT MIDFOOT ASSOCIATED WITH TYPE 2 DIABETES MELLITUS, UNSPECIFIED ULCER STAGE (HCC): Primary | ICD-10-CM

## 2020-01-04 DIAGNOSIS — E10.65 TYPE 1 DIABETES MELLITUS WITH HYPERGLYCEMIA (HCC): ICD-10-CM

## 2020-01-04 LAB
ANION GAP SERPL CALC-SCNC: 5 MMOL/L (ref 0–18)
BASOPHILS # BLD AUTO: 0.05 X10(3) UL (ref 0–0.2)
BASOPHILS NFR BLD AUTO: 0.6 %
BUN BLD-MCNC: 30 MG/DL (ref 7–18)
BUN/CREAT SERPL: 20.8 (ref 10–20)
CALCIUM BLD-MCNC: 9.5 MG/DL (ref 8.5–10.1)
CHLORIDE SERPL-SCNC: 104 MMOL/L (ref 98–112)
CO2 SERPL-SCNC: 25 MMOL/L (ref 21–32)
CREAT BLD-MCNC: 1.44 MG/DL (ref 0.55–1.02)
CRP SERPL-MCNC: 1.46 MG/DL (ref ?–0.3)
DEPRECATED RDW RBC AUTO: 43.8 FL (ref 35.1–46.3)
EOSINOPHIL # BLD AUTO: 0.31 X10(3) UL (ref 0–0.7)
EOSINOPHIL NFR BLD AUTO: 3.8 %
ERYTHROCYTE [DISTWIDTH] IN BLOOD BY AUTOMATED COUNT: 13.4 % (ref 11–15)
ERYTHROCYTE [SEDIMENTATION RATE] IN BLOOD: 33 MM/HR (ref 0–30)
GLUCOSE BLD-MCNC: 311 MG/DL (ref 70–99)
HCT VFR BLD AUTO: 37.6 % (ref 35–48)
HGB BLD-MCNC: 12.9 G/DL (ref 12–16)
IMM GRANULOCYTES # BLD AUTO: 0.05 X10(3) UL (ref 0–1)
IMM GRANULOCYTES NFR BLD: 0.6 %
LYMPHOCYTES # BLD AUTO: 1.36 X10(3) UL (ref 1–4)
LYMPHOCYTES NFR BLD AUTO: 16.6 %
MCH RBC QN AUTO: 30.6 PG (ref 26–34)
MCHC RBC AUTO-ENTMCNC: 34.3 G/DL (ref 31–37)
MCV RBC AUTO: 89.3 FL (ref 80–100)
MONOCYTES # BLD AUTO: 0.62 X10(3) UL (ref 0.1–1)
MONOCYTES NFR BLD AUTO: 7.6 %
NEUTROPHILS # BLD AUTO: 5.8 X10 (3) UL (ref 1.5–7.7)
NEUTROPHILS # BLD AUTO: 5.8 X10(3) UL (ref 1.5–7.7)
NEUTROPHILS NFR BLD AUTO: 70.8 %
OSMOLALITY SERPL CALC.SUM OF ELEC: 296 MOSM/KG (ref 275–295)
PLATELET # BLD AUTO: 224 10(3)UL (ref 150–450)
POTASSIUM SERPL-SCNC: 4.4 MMOL/L (ref 3.5–5.1)
RBC # BLD AUTO: 4.21 X10(6)UL (ref 3.8–5.3)
SODIUM SERPL-SCNC: 134 MMOL/L (ref 136–145)
WBC # BLD AUTO: 8.2 X10(3) UL (ref 4–11)

## 2020-01-04 PROCEDURE — 85025 COMPLETE CBC W/AUTO DIFF WBC: CPT | Performed by: EMERGENCY MEDICINE

## 2020-01-04 PROCEDURE — 99283 EMERGENCY DEPT VISIT LOW MDM: CPT

## 2020-01-04 PROCEDURE — 36415 COLL VENOUS BLD VENIPUNCTURE: CPT

## 2020-01-04 PROCEDURE — 85652 RBC SED RATE AUTOMATED: CPT | Performed by: EMERGENCY MEDICINE

## 2020-01-04 PROCEDURE — 86140 C-REACTIVE PROTEIN: CPT | Performed by: EMERGENCY MEDICINE

## 2020-01-04 PROCEDURE — 80048 BASIC METABOLIC PNL TOTAL CA: CPT | Performed by: EMERGENCY MEDICINE

## 2020-01-04 PROCEDURE — 73630 X-RAY EXAM OF FOOT: CPT | Performed by: EMERGENCY MEDICINE

## 2020-01-04 PROCEDURE — 96372 THER/PROPH/DIAG INJ SC/IM: CPT

## 2020-01-04 RX ORDER — HYDROCODONE BITARTRATE AND ACETAMINOPHEN 5; 325 MG/1; MG/1
1 TABLET ORAL EVERY 6 HOURS PRN
Qty: 10 TABLET | Refills: 0 | Status: SHIPPED | OUTPATIENT
Start: 2020-01-04

## 2020-01-04 RX ORDER — INSULIN ASPART 100 [IU]/ML
0.15 INJECTION, SOLUTION INTRAVENOUS; SUBCUTANEOUS ONCE
Status: COMPLETED | OUTPATIENT
Start: 2020-01-04 | End: 2020-01-04

## 2020-01-04 RX ORDER — BLOOD-GLUCOSE METER
1 KIT MISCELLANEOUS AS NEEDED
Qty: 1 KIT | Refills: 0 | Status: SHIPPED | OUTPATIENT
Start: 2020-01-04

## 2020-01-04 RX ORDER — AMOXICILLIN AND CLAVULANATE POTASSIUM 875; 125 MG/1; MG/1
1 TABLET, FILM COATED ORAL 2 TIMES DAILY
Qty: 20 TABLET | Refills: 0 | Status: SHIPPED | OUTPATIENT
Start: 2020-01-04 | End: 2020-01-06 | Stop reason: ALTCHOICE

## 2020-01-04 RX ORDER — HYDROCODONE BITARTRATE AND ACETAMINOPHEN 5; 325 MG/1; MG/1
1 TABLET ORAL ONCE
Status: COMPLETED | OUTPATIENT
Start: 2020-01-04 | End: 2020-01-04

## 2020-01-04 RX ORDER — SYRING-NEEDL,DISP,INSUL,0.3 ML 30 GX5/16"
SYRINGE, EMPTY DISPOSABLE MISCELLANEOUS
Qty: 50 EACH | Refills: 0 | Status: SHIPPED | OUTPATIENT
Start: 2020-01-04

## 2020-01-04 RX ORDER — AMOXICILLIN AND CLAVULANATE POTASSIUM 875; 125 MG/1; MG/1
1 TABLET, FILM COATED ORAL ONCE
Status: COMPLETED | OUTPATIENT
Start: 2020-01-04 | End: 2020-01-04

## 2020-01-04 NOTE — ED NOTES
Discharge instructions given to pt and . Pt and  verbalized understanding of home care, medication use, and to follow up with podiatrist and endocrinologist referrals provided. Pt and  denied further questions or concerns.  Pt ambulator

## 2020-01-04 NOTE — ED NOTES
Tunneling diabetic foot wound noted to right foot. Pt s/p amputation of all toes on this foot. Pt's  reports wound has been there >two months and is not improving. Pt denies fevers.  No redness/swelling/warmth noted to site. +white/yellow exudate not

## 2020-01-04 NOTE — ED PROVIDER NOTES
Patient Seen in: [facility] AND [facility] Emergency Department      History   Patient presents with:  Rash Skin Problem    Stated Complaint: foot injury    HPI    79-year-old female patient with diabetes and a previous amputation of her right midfoot presents w negative except as noted above.     Physical Exam     ED Triage Vitals [01/04/20 0838]   /81   Pulse 84   Resp 16   Temp 97.9 °F (36.6 °C)   Temp src Oral   SpO2 99 %   O2 Device None (Room air)       Current:/72   Pulse 74   Temp 97.9 °F (36.6 ---------                               -----------         ------                     CBC W/ DIFFERENTIAL[[id number]]                              Final result                 Please view results for these tests on the individual orders.    Olivia Nguyen

## 2020-01-06 ENCOUNTER — OFFICE VISIT (OUTPATIENT)
Dept: PODIATRY CLINIC | Facility: CLINIC | Age: 60
End: 2020-01-06

## 2020-01-06 DIAGNOSIS — E08.621 DIABETIC ULCER OF MIDFOOT ASSOCIATED WITH DIABETES MELLITUS DUE TO UNDERLYING CONDITION, WITH FAT LAYER EXPOSED, UNSPECIFIED LATERALITY (HCC): ICD-10-CM

## 2020-01-06 DIAGNOSIS — E11.42 DIABETIC POLYNEUROPATHY ASSOCIATED WITH TYPE 2 DIABETES MELLITUS (HCC): ICD-10-CM

## 2020-01-06 DIAGNOSIS — L97.402 DIABETIC ULCER OF MIDFOOT ASSOCIATED WITH DIABETES MELLITUS DUE TO UNDERLYING CONDITION, WITH FAT LAYER EXPOSED, UNSPECIFIED LATERALITY (HCC): ICD-10-CM

## 2020-01-06 DIAGNOSIS — L97.512 SKIN ULCER OF RIGHT FOOT WITH FAT LAYER EXPOSED (HCC): ICD-10-CM

## 2020-01-06 DIAGNOSIS — E11.59 CONTROLLED TYPE 2 DIABETES MELLITUS WITH OTHER CIRCULATORY COMPLICATION, WITHOUT LONG-TERM CURRENT USE OF INSULIN (HCC): Primary | ICD-10-CM

## 2020-01-06 PROCEDURE — L3260 AMBULATORY SURGICAL BOOT EAC: HCPCS | Performed by: PODIATRIST

## 2020-01-06 PROCEDURE — 99203 OFFICE O/P NEW LOW 30 MIN: CPT | Performed by: PODIATRIST

## 2020-01-06 RX ORDER — ICOSAPENT ETHYL 1000 MG/1
1 CAPSULE ORAL 4 TIMES DAILY
COMMUNITY

## 2020-01-06 RX ORDER — GLIPIZIDE 5 MG/1
5 TABLET, FILM COATED, EXTENDED RELEASE ORAL DAILY
COMMUNITY

## 2020-01-06 RX ORDER — AMOXICILLIN AND CLAVULANATE POTASSIUM 875; 125 MG/1; MG/1
1 TABLET, FILM COATED ORAL 2 TIMES DAILY
COMMUNITY

## 2020-01-06 RX ORDER — HYDROCODONE BITARTRATE AND ACETAMINOPHEN 5; 325 MG/1; MG/1
1 TABLET ORAL EVERY 4 HOURS PRN
Qty: 20 TABLET | Refills: 0 | Status: SHIPPED | OUTPATIENT
Start: 2020-01-06 | End: 2020-01-16

## 2020-01-06 NOTE — PROGRESS NOTES
Marcin Walls is a 60year old female. Patient presents with:  New Patient: Patient is diabetic, FBS was not taken this am due to meter being broken. A1C was checked on 12/11/19 with the result of 9.9.  [place] Sodium 50 MCG Oral Tab TAKE ONE TABLET BY MOUTH IN THE MORNING BEFORE BREAKFAST 30 tablet 10   • lisinopril 40 MG Oral Tab Take 1 tablet (40 mg total) by mouth daily.  90 tablet 0   • ONETOUCH ULTRASOFT LANCETS Does not apply Misc Check sugar daily 100 each quittin.9      Smokeless tobacco: Never Used    Substance and Sexual Activity      Alcohol use: No        Alcohol/week: 0.0 standard drinks      Drug use: No      Sexual activity: Yes        Partners: Male        Comment:     Other Topics long-term current use of insulin (HCC)    Diabetic ulcer of midfoot associated with diabetes mellitus due to underlying condition, with fat layer exposed, unspecified laterality ([address]    Skin ulcer of right foot with fat layer exposed ([address]    Diabetic lynn

## 2020-03-02 ENCOUNTER — OFFICE VISIT (OUTPATIENT)
Dept: PODIATRY CLINIC | Facility: CLINIC | Age: 60
End: 2020-03-02

## 2020-03-02 DIAGNOSIS — L97.402 DIABETIC ULCER OF MIDFOOT ASSOCIATED WITH DIABETES MELLITUS DUE TO UNDERLYING CONDITION, WITH FAT LAYER EXPOSED, UNSPECIFIED LATERALITY (HCC): ICD-10-CM

## 2020-03-02 DIAGNOSIS — E11.42 DIABETIC POLYNEUROPATHY ASSOCIATED WITH TYPE 2 DIABETES MELLITUS (HCC): ICD-10-CM

## 2020-03-02 DIAGNOSIS — E11.59 CONTROLLED TYPE 2 DIABETES MELLITUS WITH OTHER CIRCULATORY COMPLICATION, WITHOUT LONG-TERM CURRENT USE OF INSULIN (HCC): Primary | ICD-10-CM

## 2020-03-02 DIAGNOSIS — E08.621 DIABETIC ULCER OF MIDFOOT ASSOCIATED WITH DIABETES MELLITUS DUE TO UNDERLYING CONDITION, WITH FAT LAYER EXPOSED, UNSPECIFIED LATERALITY (HCC): ICD-10-CM

## 2020-03-02 PROCEDURE — 99213 OFFICE O/P EST LOW 20 MIN: CPT | Performed by: PODIATRIST

## 2020-03-02 RX ORDER — CEPHALEXIN 500 MG/1
500 CAPSULE ORAL 4 TIMES DAILY
Qty: 80 CAPSULE | Refills: 0 | Status: SHIPPED | OUTPATIENT
Start: 2020-03-02

## 2020-03-02 NOTE — PROGRESS NOTES
Tawanda Simmons is a 63year old female.  Patient presents with:  Diabetic Ulcer: right foot ulcer - it looks like the sking wants to come off, she is having some bleeding and pain - pain scale 7/10 - did not check BS this AM.        HPI:     Is moving with h daily.  90 tablet 0   • ONETOUCH ULTRASOFT LANCETS Does not apply Misc Check sugar daily 100 each 3      Past Medical History:   Diagnosis Date   • CAD (coronary artery disease)    • Chronic systolic CHF (congestive heart failure) ([facility] 2/27/2017   • Derrick No      Sexual activity: Yes        Partners: Male        Comment:     Other Topics      Concerns:         Service: No        Blood Transfusions: No        Caffeine Concern: No        Occupational Exposure: No        Hobby Hazards:  No BACTERIAL CULTURE; Future  -     ANAEROBIC CULTURE; Future  -     cephALEXin (KEFLEX) 500 MG Oral Cap; Take 1 capsule (500 mg total) by mouth 4 (four) times daily.         Plan: Patient is self-pay and does not want to come back before she leaves for [facility]

## 2023-02-26 NOTE — ED NOTES
7 [facility] chart is ready for review   Received report from Viry Chen [facility]. Care endorsed to this RN.

## 2024-04-25 NOTE — PLAN OF CARE
"  Assessment & Plan       ICD-10-CM    1. Hypertension goal BP (blood pressure) < 140/90  I10 lisinopril-hydrochlorothiazide (ZESTORETIC) 20-12.5 MG tablet      2. High triglycerides  E78.1         Will change to lis/hydrochlorothiazide 20/12.5mg. warning signs discussed. side effects discussed recheck with ancillary in 4 wks.   Discuss statin usage but deferred for now.     Gema Watkins is a 59 year old, presenting for the following health issues:  Hypertension    History of Present Illness       Hypertension: He presents for follow up of hypertension.  He does check blood pressure  regularly outside of the clinic. Outpatient blood pressures have not been over 140/90. He does not follow a low salt diet.     He eats 2-3 servings of fruits and vegetables daily.He consumes 0 sweetened beverage(s) daily.He exercises with enough effort to increase his heart rate 30 to 60 minutes per day.  He exercises with enough effort to increase his heart rate 3 or less days per week. He is missing 7 dose(s) of medications per week.  He is not taking prescribed medications regularly due to remembering to take.       Hypertension Follow-up    Do you check your blood pressure regularly outside of the clinic? Yes   Are you following a low salt diet? Yes  Are your blood pressures ever more than 140 on the top number (systolic) OR more   than 90 on the bottom number (diastolic), for example 140/90? Yes  No chest pain or short of breath. No headache or dizziness.       Review of Systems  Constitutional, HEENT, cardiovascular, pulmonary, gi and gu systems are negative, except as otherwise noted.      Objective    BP (!) 146/82   Pulse 65   Temp 97  F (36.1  C) (Tympanic)   Resp 16   Ht 1.803 m (5' 11\")   Wt 105.7 kg (233 lb)   SpO2 99%   BMI 32.50 kg/m    Body mass index is 32.5 kg/m .  Physical Exam   GENERAL: alert and no distress  RESP: lungs clear to auscultation - no rales, rhonchi or wheezes  CV: regular rate and rhythm, " Problem: Diabetes/Glucose Control  Goal: Glucose maintained within prescribed range  INTERVENTIONS:  - Monitor Blood Glucose as ordered  - Assess for signs and symptoms of hyperglycemia and hypoglycemia  - Administer ordered medications to maintain glucose bundle as indicated   Outcome: Progressing  Patient with three prevena woundvacs.   Goal: Oral mucous membranes remain intact  INTERVENTIONS  - Assess oral mucosa and hygiene practices  - Implement preventative oral hygiene regimen  - Implement oral medicat normal S1 S2, no S3 or S4, no murmur, click or rub, no peripheral edema  MS: no gross musculoskeletal defects noted, no edema    Labs reviewed.       The 10-year ASCVD risk score (Xiomara SAM, et al., 2019) is: 18.6%    Values used to calculate the score:      Age: 59 years      Sex: Male      Is Non- : No      Diabetic: No      Tobacco smoker: No      Systolic Blood Pressure: 146 mmHg      Is BP treated: Yes      HDL Cholesterol: 27 mg/dL      Total Cholesterol: 194 mg/dL   Signed Electronically by: Byron Modi PA-C     management  - Manage/alleviate anxiety  - Utilize distraction and/or relaxation techniques  - Monitor for opioid side effects  - Notify MD/LIP if interventions unsuccessful or patient reports new pain   Outcome: Progressing      Problem: GASTROINTESTINAL - that affect risk of falls.   - [place] fall precautions as indicated by assessment.  - Educate pt/family on patient safety including physical limitations  - Instruct pt to call for assistance with activity based on assessment  - Modify environment to redu

## (undated) DEVICE — SUTURE PDS II 2-0 CT-1

## (undated) DEVICE — CS5/5+ FASTPACK, 225ML 150U RES: Brand: HAEMONETICS CELL SAVER 5/5+ SYSTEMS

## (undated) DEVICE — STERILE TETRA-FLEX CF, ELASTIC BANDAGE, 4" X 5.5YD: Brand: TETRA-FLEX™CF

## (undated) DEVICE — CATH SECURING DEVICE STATLOCK

## (undated) DEVICE — HEART DRAPE & SUPPLY PACK: Brand: MEDLINE INDUSTRIES, INC.

## (undated) DEVICE — FORESIGHT LARGE SENSOR: Brand: FORESIGHT

## (undated) DEVICE — SUCTION CANISTER, 3000CC,SAFELINER: Brand: DEROYAL

## (undated) DEVICE — SUTURE SURGICAL STEEL #7

## (undated) DEVICE — RETROGRADE CARDIOPLEGIA CATHETER: Brand: EDWARDS LIFESCIENCES RETROGRADE CARDIOPLEGIA CATHETER

## (undated) DEVICE — CONNECTOR PRFSN QCK PRM .25IN

## (undated) DEVICE — STABILIZER SRG UNV AST CABG

## (undated) DEVICE — DRAPE SLUSH/WARMER W/DISC

## (undated) DEVICE — SUTURE SILK 4-0 SA63H

## (undated) DEVICE — SUTURE PDS II 1 CTX

## (undated) DEVICE — SUTURE SILK 0 FSL

## (undated) DEVICE — HEART A: Brand: MEDLINE INDUSTRIES, INC.

## (undated) DEVICE — CANNULA AORTIC 21 FR SOFT FLOW

## (undated) DEVICE — BLOOD TRANSFUSION FILTER: Brand: HAEMONETICS

## (undated) DEVICE — SUTURE SILK 1-0 SA87G

## (undated) DEVICE — SUTURE PROLENE 6-0 C-1

## (undated) DEVICE — THORACIC CATHETER, STRAIGHT, SILICONE, WITH CLOTSTOP®: Brand: AXIOM® ATRAUM™ WITH CLOTSTOP®

## (undated) DEVICE — UNIT THERA PREVENA 45ML

## (undated) DEVICE — ABSORBABLE HEMOSTAT (OXIDIZED REGENERATED CELLULOSE, U.S.P.): Brand: SURGICEL

## (undated) DEVICE — LEAD BIPOLAR TEMP 6495XF53

## (undated) DEVICE — CANNULA PRFSN 15IN 32/40FR .5

## (undated) DEVICE — PREVENA PEEL & PLACE SYSTEM KIT- 13 CM: Brand: PREVENA™ PEEL & PLACE™

## (undated) DEVICE — PAD PLMM SLVR 12.5X4IN SLVR

## (undated) DEVICE — SUTURE PROLENE 4-0 BB

## (undated) DEVICE — ADAPTER CRDPLG ANTGRD RTRGD 3W

## (undated) DEVICE — PACK ASSRY CUSTOM TUBING

## (undated) DEVICE — SOL  .9 1000ML BTL

## (undated) DEVICE — PUNCH AORTIC 4.0 LONG HANDLE

## (undated) DEVICE — INSERT SUTURE OPEN HEART

## (undated) DEVICE — SUTURE PROLENE 7-0 BV175-6

## (undated) DEVICE — HEMOCLIP HORIZON SM MULTI

## (undated) DEVICE — SUTURE SILK 2-0 SH

## (undated) DEVICE — SUTURE ETHIBOND 0 CT-1

## (undated) DEVICE — SUTURE PROLENE 5-0 C-1

## (undated) DEVICE — Device: Brand: CDI™ SHUNT SENSOR

## (undated) DEVICE — POUCH: SSEAL TYVEK 2000/CS: Brand: MEDICAL ACTION INDUSTRIES

## (undated) DEVICE — THORACIC CATHETER, RIGHT ANGLE, SILICONE, WITH CLOTSTOP®: Brand: AXIOM® ATRAUM™ WITH CLOTSTOP®

## (undated) DEVICE — BATTERY

## (undated) DEVICE — SOL  .9 1000ML BAG

## (undated) DEVICE — SUTURE WIRE DOUBLE STERNOTOMY

## (undated) DEVICE — SUTURE MONOCRYL 3-0 Y936H

## (undated) DEVICE — SUTURE PROLENE 3-0 SH

## (undated) DEVICE — STERILE TETRA-FLEX CF, ELASTIC BANDAGE LATEX FREE 6IN X5.5 YD: Brand: TETRA-FLEX™CF

## (undated) DEVICE — CARTRIDGE HC HMS+ CRTDG SYR

## (undated) DEVICE — SUTURE SILK 2-0 SA85H

## (undated) DEVICE — 3M™ TEGADERM™ TRANSPARENT FILM DRESSING, 1626W, 4 IN X 4-3/4 IN (10 CM X 12 CM), 50 EACH/CARTON, 4 CARTON/CASE: Brand: 3M™ TEGADERM™

## (undated) DEVICE — 12 FOOT DISPOSABLE EXTENSION CABLE WITH SAFE CONNECT / SCREW-DOWN

## (undated) DEVICE — SUTURE PROLENE 7-0 8701H

## (undated) DEVICE — CARD SMRT MEDISTEM VERIQ TRNST

## (undated) DEVICE — PACK CUSTOM TUBING

## (undated) DEVICE — STERILE POLYISOPRENE POWDER-FREE SURGICAL GLOVES: Brand: PROTEXIS

## (undated) DEVICE — SUTURE VICRYL 1-0 J977H

## (undated) DEVICE — FLOSEAL SEALENT STERILE 10ML

## (undated) DEVICE — DEVICE BLWR/MSTR ACCUMIST ATCH

## (undated) DEVICE — SUTURE PROLENE 7-0 BV-1

## (undated) DEVICE — ALARM PT PTCH LVL

## (undated) NOTE — Clinical Note
Cyrus Solano, I saw Mrs Gill Jorgensen today. Her weight is up 4 lbs on my scale and up on her home scale but no signs of fluid. BNP 82. She thinks this is true weight gain.  Her K+ 4.9 so I was uncomfortable increasing her lisinopril at this time as she is also on Sp

## (undated) NOTE — LETTER
[facility]  Administration of Anesthesia  1. Mariana Hurtado, or _________________________________ acting on her behalf, (Patient) (Dependent/Representative) request to receive anesthesia for my pending procedure/operation/treatment.   GREG p infections, high spinal block, spinal bleeding, seizure, cardiac arrest and death. 7. AWARENESS: I understand that it is possible (but unlikely) to have explicit memory of events from the operating room while under general anesthesia.   8. ELECTROCONVULSIV (Date) (Time)                                                                                               (Responsible person in case of minor/ unconscious pt) /Relationship    My signature below affirms that prior to the time of the procedure, I have ex

## (undated) NOTE — ED AVS SNAPSHOT
Luciana Moe   MRN: [id number]    Department:  [facility] Emergency Department   Date of Visit:  5/7/2018           Disclosure     Insurance plans vary and the physician(s) referred by the ER may not be covered by your plan.  Please contact you CARE PHYSICIAN AT ONCE OR RETURN IMMEDIATELY TO THE EMERGENCY DEPARTMENT. If you have been prescribed any medication(s), please fill your prescription right away and begin taking the medication(s) as directed.   If you believe that any of the medications

## (undated) NOTE — ED AVS SNAPSHOT
[facility] Emergency Department  [address] [address]  Phone:  [phone]  Fax:  Cassie   MRN: [id number]    Department:  [facility] Emergency Department   Date of Visit:  7/15/2017 visiting [email or web address].    IF THERE IS ANY CHANGE OR WORSENING OF YOUR CONDITION, CALL YOUR PRIMARY CARE PHYSICIAN AT ONCE OR RETURN IMMEDIATELY TO THE EMERGENCY DEPARTMENT.     If you have been prescribed any medication(s), please fill your prescription

## (undated) NOTE — LETTER
[address] [facility], [facility] [address]     I agree to have a Peripherally Inserted Central Catheter (PICC) placed in my arm.      1. The PICC insertion procedure, care, maintenance, risks, benefits, and complications Statement of Physician: My signature below affirms that prior to the time of the PICC line insertion, I have explained to the patient and/or his/her legal representative, the risks and benefits involved in the proposed treatment and any reasonable alternat

## (undated) NOTE — LETTER
[address] [place] [address] Rd, [place], [place]     AUTHORIZATION FOR SURGICAL OPERATION OR PROCEDURE    1.  I hereby authorize Dr. Alexa Oconnor, my Physician(s) and whomever may be designated as the doctor's Assistant, to perform the following op 5. I consent to the photographing of procedure(s) to be performed for the purposes of advancing medicine, science and/or education, provided my identity is not revealed.  If the procedure has been videotaped, the physician/surgeon will obtain the original v (Witness signature)                                                                                                  (Date)                                (Time)  STATEMENT OF PHYSICIAN My signature below affirms that prior to the time of the procedure;  I

## (undated) NOTE — MR AVS SNAPSHOT
Jj Cole [address] [place] [address]  Yecenia Ding [phone]  [phone]               Thank you for choosing us for your health care visit with JOSE Zaragoza.   We are glad to serve you and happy to pr Commonly known as:  LIPITOR           furosemide 20 MG Tabs   Take 1 tablet (20 mg total) by mouth daily.    Commonly known as:  LASIX           Levothyroxine Sodium 50 MCG Tabs   Take 1 tablet (50 mcg total) by mouth before breakfast.   Commonly known as: If you've recently had a stay at the Hospital you can access your discharge instructions in [facility] by going to Visits < Admission Summaries.  If you've been to the Emergency Department or your doctor's office, you can view your past visit information in My

## (undated) NOTE — LETTER
[facility]  Administration of Anesthesia  1. Ansley Kruse, or _________________________________ acting on her behalf, (Patient) (Dependent/Representative) request to receive anesthesia for my pending procedure/operation/treatment.   GREG sánchez infections, high spinal block, spinal bleeding, seizure, cardiac arrest and death. 7. AWARENESS: I understand that it is possible (but unlikely) to have explicit memory of events from the operating room while under general anesthesia.   8. ELECTROCONVULSIV (Date) (Time)                                                                                               (Responsible person in case of minor/ unconscious pt) /Relationship    My signature below affirms that prior to the time of the procedure, I have ex

## (undated) NOTE — LETTER
[facility]  Administration of Anesthesia  1. Ayse More, or _________________________________ acting on her behalf, (Patient) (Dependent/Representative) request to receive anesthesia for my pending procedure/operation/treatment.   GREG p infections, high spinal block, spinal bleeding, seizure, cardiac arrest and death. 7. AWARENESS: I understand that it is possible (but unlikely) to have explicit memory of events from the operating room while under general anesthesia.   8. ELECTROCONVULSIV (Date) (Time)                                                                                               (Responsible person in case of minor/ unconscious pt) /Relationship    My signature below affirms that prior to the time of the procedure, I have ex

## (undated) NOTE — LETTER
[address]ler [address], [facility], [facility]  [address]  INFORMED CONSENT FOR TRANSFUSION OF BLOOD OR BLOOD PRODUCTS   My physician has informed me of the nature, purpose, benefits and risks of transfusion for blood and blood components aly (Signature of Patient)                                                           (Responsible party in case of Minor,                                                                                                Incompetent, or unconscious Patient)  _____

## (undated) NOTE — MR AVS SNAPSHOT
Jj Cole [address] [place] [address] [phone]  [phone]               Thank you for choosing us for your health care visit with JOSE Brush.   We are glad to serve you and happy to pr This list is accurate as of: 2/13/17  1:42 PM.  Always use your most recent med list.                AmLODIPine Besylate 5 MG Tabs   Take 1 tablet (5 mg total) by mouth daily.    Commonly known as:  NORVASC           aspirin 325 MG Tabs   Take 1 tablet (325 Chronic Kidney Disease: GFR <60 ml/min/1.73 m2  Kidney failure: GFR <15 ml/min/1.73 m2    The accuracy of the MDRD equation is not suitable for acute renal failure patients and it is not recommended for use with pregnant women.                   MyChart

## (undated) NOTE — LETTER
[address] [place] [address] Rd, [place], [place]     AUTHORIZATION FOR SURGICAL OPERATION OR PROCEDURE    1.  I hereby authorize Dr. Belkys Ac*, my Physician(s) and whomever may be designated as the doctor's Assistant, to perform the following 5. I consent to the photographing of procedure(s) to be performed for the purposes of advancing medicine, science and/or education, provided my identity is not revealed.  If the procedure has been videotaped, the physician/surgeon will obtain the original v (Witness signature)                                                                                                  (Date)                                (Time)  STATEMENT OF PHYSICIAN My signature below affirms that prior to the time of the procedure;  I

## (undated) NOTE — MR AVS SNAPSHOT
Jj Cole [address] [place] [address] 185 [phone]               Thank you for choosing us for your health care visit with JOSE Zapata.   We are glad to serve you and happy to pr aspirin 325 MG Tabs   Take 1 tablet (325 mg total) by mouth daily. Atorvastatin Calcium 40 MG Tabs   Take 1 tablet (40 mg total) by mouth nightly.    Commonly known as:  LIPITOR           furosemide 20 MG Tabs   Take 1 tablet (20 mg total) by dwight information, go to [email or web address] [email or web address]. org and click on the Sign Up Now link in the [employer] box. Enter your Reeher Activation Code exactly as it appears below along with your Zip Code and Date of Birth to complete the sign-up process.  If you do

## (undated) NOTE — LETTER
[address] Lawler [address], [place], [place]  [address]  INFORMED CONSENT FOR TRANSFUSION OF BLOOD OR BLOOD PRODUCTS   My physician has informed me of the nature, purpose, benefits and risks of transfusion for blood and blood components aly (Signature of Patient)                                                           (Responsible party in case of Minor,                                                                                                Incompetent, or unconscious Patient)  _____

## (undated) NOTE — LETTER
[facility], [place]  Authorization for Invasive Procedures  1. I hereby authorize  Dr Tyler Graham , my physician and whomever may be designated as the doctor's assistant, to perform the following operation and/or procedure:  Insertion of performed for the purposes of advancing medicine, science, and/or education, provided my identity is not revealed. If the procedure has been videotaped, the physician/surgeon will obtain the original videotape.  The hospital will not be responsible for stor My signature below affirms that prior to the time of the procedure, I have explained to the patient and/or her legal representative, the risks and benefits involved in the proposed treatment and any reasonable alternative to the proposed treatment.  I have

## (undated) NOTE — Clinical Note
Katy Lemos seen in clinic today. Euvolemic. Cr up 1.8. Stopped Lasix, held Lisinopril x 1 dose then decreased to 5 mg daily. WBCs also elevated. No F/Cs. UA/CXR done. UA neg. CXR pending. F/U with PCP this week. Will repeat labs on Friday.  F/U with Kyra

## (undated) NOTE — ED AVS SNAPSHOT
Rachel Yarbrough   MRN: [id number]    Department:  [facility] Emergency Department   Date of Visit:  1/4/2020           Disclosure     Insurance plans vary and the physician(s) referred by the ER may not be covered by your plan.  Please contact you CARE PHYSICIAN AT ONCE OR RETURN IMMEDIATELY TO THE EMERGENCY DEPARTMENT. If you have been prescribed any medication(s), please fill your prescription right away and begin taking the medication(s) as directed.   If you believe that any of the medications

## (undated) NOTE — LETTER
[address], [place]  Authorization for Invasive Procedures  1.  I hereby authorize Dr. Joyce Barton , my physician and whomever may be designated as the doctor's assistant, to perform the following operation and/or procedure: Cardiac cath the event that I wish to have autologous transfusions of my own blood, or a directed donor transfusion, I will discuss this with my physician.      5. I consent to the photographing of the operations or procedures to be performed for the purposes of advanci Responsible person in case of minor or unconscious: _____________________________Relationship: ____________     Witness Signature: ____________________________________________ Date: __________ Time: ___________    Statement of Physician  My signature below

## (undated) NOTE — LETTER
[address], [place]  Authorization for Invasive Procedures  1.  I hereby authorize Dr. Opal Watson , my physician and whomever may be designated as the doctor's assistant, to perform the following operation and/or procedure: Cardiac the event that I wish to have autologous transfusions of my own blood, or a directed donor transfusion, I will discuss this with my physician.      5. I consent to the photographing of the operations or procedures to be performed for the purposes of advanci Responsible person in case of minor or unconscious: _____________________________Relationship: ____________     Witness Signature: ____________________________________________ Date: __________ Time: ___________    Statement of Physician  My signature below

## (undated) NOTE — LETTER
[address]  [facility] [address], [place], [place]  [address]  INFORMED CONSENT FOR TRANSFUSION OF BLOOD OR BLOOD PRODUCTS   My physician has informed me of the nature, purpose, benefits and risks of transfusion for blood and blood components aly (Signature of Patient)                                                           (Responsible party in case of Minor,                                                                                                Incompetent, or unconscious Patient)  _____

## (undated) NOTE — ED AVS SNAPSHOT
Thuan New   MRN: [id number]    Department:  [facility] Emergency Department   Date of Visit:  2/21/2018           Disclosure     Insurance plans vary and the physician(s) referred by the ER may not be covered by your plan.  Please contact yo CARE PHYSICIAN AT ONCE OR RETURN IMMEDIATELY TO THE EMERGENCY DEPARTMENT. If you have been prescribed any medication(s), please fill your prescription right away and begin taking the medication(s) as directed.   If you believe that any of the medications

## (undated) NOTE — ED AVS SNAPSHOT
Evelyn West   MRN: [id number]    Department:  [facility] Emergency Department   Date of Visit:  12/3/2019           Disclosure     Insurance plans vary and the physician(s) referred by the ER may not be covered by your plan.  Please contact yo CARE PHYSICIAN AT ONCE OR RETURN IMMEDIATELY TO THE EMERGENCY DEPARTMENT. If you have been prescribed any medication(s), please fill your prescription right away and begin taking the medication(s) as directed.   If you believe that any of the medications